# Patient Record
Sex: MALE | Race: WHITE | NOT HISPANIC OR LATINO | URBAN - METROPOLITAN AREA
[De-identification: names, ages, dates, MRNs, and addresses within clinical notes are randomized per-mention and may not be internally consistent; named-entity substitution may affect disease eponyms.]

---

## 2018-07-15 ENCOUNTER — INPATIENT (INPATIENT)
Facility: HOSPITAL | Age: 58
LOS: 2 days | Discharge: HOME | DRG: 189 | End: 2018-07-18
Attending: HOSPITALIST | Admitting: INTERNAL MEDICINE
Payer: OTHER GOVERNMENT

## 2018-07-15 VITALS — WEIGHT: 214.95 LBS | HEIGHT: 68 IN

## 2018-07-15 DIAGNOSIS — J96.01 ACUTE RESPIRATORY FAILURE WITH HYPOXIA: ICD-10-CM

## 2018-07-15 DIAGNOSIS — J44.1 CHRONIC OBSTRUCTIVE PULMONARY DISEASE WITH (ACUTE) EXACERBATION: ICD-10-CM

## 2018-07-15 DIAGNOSIS — I25.10 ATHEROSCLEROTIC HEART DISEASE OF NATIVE CORONARY ARTERY WITHOUT ANGINA PECTORIS: ICD-10-CM

## 2018-07-15 DIAGNOSIS — E87.2 ACIDOSIS: ICD-10-CM

## 2018-07-15 DIAGNOSIS — G47.33 OBSTRUCTIVE SLEEP APNEA (ADULT) (PEDIATRIC): ICD-10-CM

## 2018-07-15 DIAGNOSIS — I10 ESSENTIAL (PRIMARY) HYPERTENSION: ICD-10-CM

## 2018-07-15 LAB
ALBUMIN SERPL ELPH-MCNC: 4.3 G/DL — SIGNIFICANT CHANGE UP (ref 3.3–5.2)
ALP SERPL-CCNC: 76 U/L — SIGNIFICANT CHANGE UP (ref 40–120)
ALT FLD-CCNC: 21 U/L — SIGNIFICANT CHANGE UP
ANION GAP SERPL CALC-SCNC: 23 MMOL/L — HIGH (ref 5–17)
ANISOCYTOSIS BLD QL: SLIGHT — SIGNIFICANT CHANGE UP
AST SERPL-CCNC: 39 U/L — SIGNIFICANT CHANGE UP
BASE EXCESS BLDA CALC-SCNC: -3.8 MMOL/L — LOW (ref -2–2)
BILIRUB SERPL-MCNC: 0.4 MG/DL — SIGNIFICANT CHANGE UP (ref 0.4–2)
BLOOD GAS COMMENTS ARTERIAL: SIGNIFICANT CHANGE UP
BUN SERPL-MCNC: 17 MG/DL — SIGNIFICANT CHANGE UP (ref 8–20)
CALCIUM SERPL-MCNC: 9.1 MG/DL — SIGNIFICANT CHANGE UP (ref 8.6–10.2)
CHLORIDE SERPL-SCNC: 97 MMOL/L — LOW (ref 98–107)
CK MB CFR SERPL CALC: 11.1 NG/ML — HIGH (ref 0–6.7)
CK SERPL-CCNC: 286 U/L — HIGH (ref 30–200)
CO2 SERPL-SCNC: 19 MMOL/L — LOW (ref 22–29)
CREAT SERPL-MCNC: 0.65 MG/DL — SIGNIFICANT CHANGE UP (ref 0.5–1.3)
GAS PNL BLDA: SIGNIFICANT CHANGE UP
GLUCOSE SERPL-MCNC: 109 MG/DL — SIGNIFICANT CHANGE UP (ref 70–115)
HCO3 BLDA-SCNC: 21 MMOL/L — SIGNIFICANT CHANGE UP (ref 20–26)
HCT VFR BLD CALC: 44.1 % — SIGNIFICANT CHANGE UP (ref 42–52)
HGB BLD-MCNC: 14.5 G/DL — SIGNIFICANT CHANGE UP (ref 14–18)
HOROWITZ INDEX BLDA+IHG-RTO: 50 — SIGNIFICANT CHANGE UP
LACTATE BLDV-MCNC: 7 MMOL/L — CRITICAL HIGH (ref 0.5–2)
LYMPHOCYTES # BLD AUTO: 5 % — LOW (ref 20–55)
MACROCYTES BLD QL: SLIGHT — SIGNIFICANT CHANGE UP
MCHC RBC-ENTMCNC: 32.9 G/DL — SIGNIFICANT CHANGE UP (ref 32–36)
MCHC RBC-ENTMCNC: 32.9 PG — HIGH (ref 27–31)
MCV RBC AUTO: 100 FL — HIGH (ref 80–94)
MICROCYTES BLD QL: SLIGHT — SIGNIFICANT CHANGE UP
MONOCYTES NFR BLD AUTO: 4 % — SIGNIFICANT CHANGE UP (ref 3–10)
NEUTROPHILS NFR BLD AUTO: 91 % — HIGH (ref 37–73)
NT-PROBNP SERPL-SCNC: 1078 PG/ML — HIGH (ref 0–300)
PCO2 BLDA: 43 MMHG — SIGNIFICANT CHANGE UP (ref 35–45)
PH BLDA: 7.32 — LOW (ref 7.35–7.45)
PLAT MORPH BLD: NORMAL — SIGNIFICANT CHANGE UP
PLATELET # BLD AUTO: 177 K/UL — SIGNIFICANT CHANGE UP (ref 150–400)
PO2 BLDA: 104 MMHG — SIGNIFICANT CHANGE UP (ref 83–108)
POTASSIUM SERPL-MCNC: 5.6 MMOL/L — HIGH (ref 3.5–5.3)
POTASSIUM SERPL-SCNC: 5.6 MMOL/L — HIGH (ref 3.5–5.3)
PROT SERPL-MCNC: 7.7 G/DL — SIGNIFICANT CHANGE UP (ref 6.6–8.7)
RBC # BLD: 4.41 M/UL — LOW (ref 4.6–6.2)
RBC # FLD: 15.9 % — HIGH (ref 11–15.6)
RBC BLD AUTO: ABNORMAL
SAO2 % BLDA: 98 % — SIGNIFICANT CHANGE UP (ref 95–99)
SODIUM SERPL-SCNC: 139 MMOL/L — SIGNIFICANT CHANGE UP (ref 135–145)
TROPONIN T SERPL-MCNC: <0.01 NG/ML — SIGNIFICANT CHANGE UP (ref 0–0.06)
WBC # BLD: 14.7 K/UL — HIGH (ref 4.8–10.8)
WBC # FLD AUTO: 14.7 K/UL — HIGH (ref 4.8–10.8)

## 2018-07-15 PROCEDURE — 93010 ELECTROCARDIOGRAM REPORT: CPT

## 2018-07-15 PROCEDURE — 71045 X-RAY EXAM CHEST 1 VIEW: CPT | Mod: 26

## 2018-07-15 PROCEDURE — 99291 CRITICAL CARE FIRST HOUR: CPT

## 2018-07-15 RX ORDER — TIOTROPIUM BROMIDE 18 UG/1
1 CAPSULE ORAL; RESPIRATORY (INHALATION) DAILY
Qty: 0 | Refills: 0 | Status: COMPLETED | OUTPATIENT
Start: 2018-07-15 | End: 2019-06-13

## 2018-07-15 RX ORDER — MAGNESIUM SULFATE 500 MG/ML
2 VIAL (ML) INJECTION ONCE
Qty: 0 | Refills: 0 | Status: COMPLETED | OUTPATIENT
Start: 2018-07-15 | End: 2018-07-15

## 2018-07-15 RX ORDER — CLOPIDOGREL BISULFATE 75 MG/1
75 TABLET, FILM COATED ORAL DAILY
Qty: 0 | Refills: 0 | Status: DISCONTINUED | OUTPATIENT
Start: 2018-07-15 | End: 2018-07-18

## 2018-07-15 RX ORDER — IPRATROPIUM/ALBUTEROL SULFATE 18-103MCG
3 AEROSOL WITH ADAPTER (GRAM) INHALATION EVERY 6 HOURS
Qty: 0 | Refills: 0 | Status: DISCONTINUED | OUTPATIENT
Start: 2018-07-15 | End: 2018-07-18

## 2018-07-15 RX ORDER — SODIUM CHLORIDE 9 MG/ML
3 INJECTION INTRAMUSCULAR; INTRAVENOUS; SUBCUTANEOUS ONCE
Qty: 0 | Refills: 0 | Status: COMPLETED | OUTPATIENT
Start: 2018-07-15 | End: 2018-07-15

## 2018-07-15 RX ORDER — METOPROLOL TARTRATE 50 MG
50 TABLET ORAL DAILY
Qty: 0 | Refills: 0 | Status: DISCONTINUED | OUTPATIENT
Start: 2018-07-15 | End: 2018-07-18

## 2018-07-15 RX ORDER — ALBUTEROL 90 UG/1
2.5 AEROSOL, METERED ORAL ONCE
Qty: 0 | Refills: 0 | Status: COMPLETED | OUTPATIENT
Start: 2018-07-15 | End: 2018-07-15

## 2018-07-15 RX ORDER — AZITHROMYCIN 500 MG/1
TABLET, FILM COATED ORAL
Qty: 0 | Refills: 0 | Status: DISCONTINUED | OUTPATIENT
Start: 2018-07-15 | End: 2018-07-16

## 2018-07-15 RX ORDER — ASPIRIN/CALCIUM CARB/MAGNESIUM 324 MG
81 TABLET ORAL DAILY
Qty: 0 | Refills: 0 | Status: DISCONTINUED | OUTPATIENT
Start: 2018-07-15 | End: 2018-07-18

## 2018-07-15 RX ORDER — ASPIRIN/CALCIUM CARB/MAGNESIUM 324 MG
1 TABLET ORAL
Qty: 0 | Refills: 0 | COMMUNITY

## 2018-07-15 RX ORDER — AZITHROMYCIN 500 MG/1
500 TABLET, FILM COATED ORAL ONCE
Qty: 0 | Refills: 0 | Status: COMPLETED | OUTPATIENT
Start: 2018-07-15 | End: 2018-07-15

## 2018-07-15 RX ORDER — FUROSEMIDE 40 MG
40 TABLET ORAL ONCE
Qty: 0 | Refills: 0 | Status: COMPLETED | OUTPATIENT
Start: 2018-07-15 | End: 2018-07-15

## 2018-07-15 RX ORDER — ENOXAPARIN SODIUM 100 MG/ML
40 INJECTION SUBCUTANEOUS DAILY
Qty: 0 | Refills: 0 | Status: DISCONTINUED | OUTPATIENT
Start: 2018-07-15 | End: 2018-07-18

## 2018-07-15 RX ORDER — LOSARTAN POTASSIUM 100 MG/1
1 TABLET, FILM COATED ORAL
Qty: 0 | Refills: 0 | COMMUNITY

## 2018-07-15 RX ORDER — CLOPIDOGREL BISULFATE 75 MG/1
1 TABLET, FILM COATED ORAL
Qty: 0 | Refills: 0 | COMMUNITY

## 2018-07-15 RX ORDER — TIOTROPIUM BROMIDE 18 UG/1
1 CAPSULE ORAL; RESPIRATORY (INHALATION)
Qty: 0 | Refills: 0 | COMMUNITY

## 2018-07-15 RX ORDER — LABETALOL HCL 100 MG
10 TABLET ORAL EVERY 4 HOURS
Qty: 0 | Refills: 0 | Status: DISCONTINUED | OUTPATIENT
Start: 2018-07-15 | End: 2018-07-18

## 2018-07-15 RX ORDER — IPRATROPIUM/ALBUTEROL SULFATE 18-103MCG
3 AEROSOL WITH ADAPTER (GRAM) INHALATION ONCE
Qty: 0 | Refills: 0 | Status: COMPLETED | OUTPATIENT
Start: 2018-07-15 | End: 2018-07-15

## 2018-07-15 RX ORDER — AZITHROMYCIN 500 MG/1
500 TABLET, FILM COATED ORAL EVERY 24 HOURS
Qty: 0 | Refills: 0 | Status: DISCONTINUED | OUTPATIENT
Start: 2018-07-16 | End: 2018-07-16

## 2018-07-15 RX ORDER — ALBUTEROL 90 UG/1
1 AEROSOL, METERED ORAL EVERY 4 HOURS
Qty: 0 | Refills: 0 | Status: DISCONTINUED | OUTPATIENT
Start: 2018-07-15 | End: 2018-07-17

## 2018-07-15 RX ORDER — METOPROLOL TARTRATE 50 MG
1 TABLET ORAL
Qty: 0 | Refills: 0 | COMMUNITY

## 2018-07-15 RX ORDER — LOSARTAN POTASSIUM 100 MG/1
25 TABLET, FILM COATED ORAL DAILY
Qty: 0 | Refills: 0 | Status: DISCONTINUED | OUTPATIENT
Start: 2018-07-15 | End: 2018-07-18

## 2018-07-15 RX ADMIN — LOSARTAN POTASSIUM 25 MILLIGRAM(S): 100 TABLET, FILM COATED ORAL at 23:15

## 2018-07-15 RX ADMIN — Medication 81 MILLIGRAM(S): at 23:15

## 2018-07-15 RX ADMIN — ENOXAPARIN SODIUM 40 MILLIGRAM(S): 100 INJECTION SUBCUTANEOUS at 23:15

## 2018-07-15 RX ADMIN — Medication 125 MILLIGRAM(S): at 19:05

## 2018-07-15 RX ADMIN — Medication 50 GRAM(S): at 19:04

## 2018-07-15 RX ADMIN — Medication 60 MILLIGRAM(S): at 23:18

## 2018-07-15 RX ADMIN — AZITHROMYCIN 255 MILLIGRAM(S): 500 TABLET, FILM COATED ORAL at 23:15

## 2018-07-15 RX ADMIN — CLOPIDOGREL BISULFATE 75 MILLIGRAM(S): 75 TABLET, FILM COATED ORAL at 23:15

## 2018-07-15 RX ADMIN — Medication 50 MILLIGRAM(S): at 23:17

## 2018-07-15 RX ADMIN — ALBUTEROL 2.5 MILLIGRAM(S): 90 AEROSOL, METERED ORAL at 19:05

## 2018-07-15 RX ADMIN — Medication 3 MILLILITER(S): at 19:05

## 2018-07-15 RX ADMIN — Medication 40 MILLIGRAM(S): at 19:04

## 2018-07-15 RX ADMIN — SODIUM CHLORIDE 3 MILLILITER(S): 9 INJECTION INTRAMUSCULAR; INTRAVENOUS; SUBCUTANEOUS at 19:05

## 2018-07-15 NOTE — ED PROVIDER NOTE - CARE PLAN
Principal Discharge DX:	Acute respiratory failure with hypoxia  Secondary Diagnosis:	COPD exacerbation

## 2018-07-15 NOTE — ED ADULT TRIAGE NOTE - CHIEF COMPLAINT QUOTE
pt comes to ed from home with increased shortness of breath. has copd noncompliant with cpap at night for sleep apnea. patient retracting; dr araujo to bedside; code team rendered further care in critical care area

## 2018-07-15 NOTE — ED PROVIDER NOTE - MEDICAL DECISION MAKING DETAILS
59 y/o M 59 y/o M presents with acute respiratory failure - CHF vs COPD: Will treat appropriately and consult NICU.

## 2018-07-15 NOTE — ED ADULT NURSE NOTE - OBJECTIVE STATEMENT
Pt with SOB that has become increasingly worse over the last week. No relief with inhaler at home. Denies any fevers or congestion. Pt smokes a half a pack per day.

## 2018-07-15 NOTE — H&P ADULT - PMH
CAD (coronary artery disease)    COPD (chronic obstructive pulmonary disease)    Hypertension    GLORIA (obstructive sleep apnea)

## 2018-07-15 NOTE — H&P ADULT - HISTORY OF PRESENT ILLNESS
58M hx COPD still smoking  GLORIA (partially compliant with nocturnal CPAP) [facility].  Ex polysubstance abuse (cocaine/marijuana).  ETOH abuse.  He has not had a drink in a few weeks and is currently in a [facility]ri[facility] the [facility].      Admits to monthly episodes of distressed breathing for which he is prescribed prednisone.  Has never been intubated or used NIV.   Has been SOB and wheezy for the last 2 days without change in cough or sputum.       In ED placed on BIPAP rx with steroids and nebulizers unable to liberate from NIV   some hypoxemia

## 2018-07-15 NOTE — H&P ADULT - ASSESSMENT
58M hx COPD still smoking  GLORIA obesity  HTN CAD with stent 2017 at [facility].      AECOPD with hypoxemic (VQ mismatch) respiratory failure unable to liberate from NIV.            CCT 33 min

## 2018-07-15 NOTE — ED PROVIDER NOTE - OBJECTIVE STATEMENT
59 y/o M with past hx of COPD and MI presents to the ED c/o SOB which onset today. Pt was found sitting on top of the stairs. As per EMS, pt's respiratory distress has worsened in the past 30 minutes. He has never been to the ED before and says he has never been told he had heart failure. Pt is on Symbicort, albuterol, Toprol, Plavix, and aspirin. He is a smoker and drink occasionally. Pt denies any street drug usage and denies fevers or chills. No further complaints at this time. 57 y/o M with past hx of COPD and MI presents to the ED c/o SOB which onset today. Pt was found sitting on top of the stairs. As per EMS, pt's respiratory distress has worsened in the past 30 minutes. He has never been to the ED before and says he has never been told he had heart failure. Pt is on Symbicort, albuterol, Toprol, Plavix, and aspirin. He is a smoker and drink occasionally. Pt denies any street drug usage and denies fevers, chills, cough, or rhinorrhea. No further complaints at this time. 59 y/o M with past hx of COPD and MI presents to the ED c/o SOB which onset today. Pt was found sitting on top of the stairs. As per EMS, pt's respiratory distress has worsened in the past 30 minutes. He has never been to the ED before and says he has never been told he had heart failure. Pt is on Symbicort, albuterol, Toprol, Plavix, and aspirin. He is a smoker, drinks occasionally, and is allergic to penicillin & Lisinopril. Pt denies any street drug usage and denies fevers, chills, cough, or rhinorrhea. No further complaints at this time.

## 2018-07-15 NOTE — ED PROVIDER NOTE - ENMT NEGATIVE STATEMENT, MLM
Ears: no ear pain and no hearing problems.Nose: no nasal congestion and no nasal drainage. no rhinorrhea. Mouth/Throat: no dysphagia, no hoarseness and no throat pain.Neck: no lumps, no pain, no stiffness and no swollen glands.

## 2018-07-16 DIAGNOSIS — J44.9 CHRONIC OBSTRUCTIVE PULMONARY DISEASE, UNSPECIFIED: ICD-10-CM

## 2018-07-16 DIAGNOSIS — I25.118 ATHEROSCLEROTIC HEART DISEASE OF NATIVE CORONARY ARTERY WITH OTHER FORMS OF ANGINA PECTORIS: ICD-10-CM

## 2018-07-16 LAB
ANION GAP SERPL CALC-SCNC: 17 MMOL/L — SIGNIFICANT CHANGE UP (ref 5–17)
BASE EXCESS BLDA CALC-SCNC: 7.2 MMOL/L — HIGH (ref -2–2)
BLOOD GAS COMMENTS ARTERIAL: SIGNIFICANT CHANGE UP
BUN SERPL-MCNC: 18 MG/DL — SIGNIFICANT CHANGE UP (ref 8–20)
CALCIUM SERPL-MCNC: 9.3 MG/DL — SIGNIFICANT CHANGE UP (ref 8.6–10.2)
CHLORIDE SERPL-SCNC: 93 MMOL/L — LOW (ref 98–107)
CO2 SERPL-SCNC: 28 MMOL/L — SIGNIFICANT CHANGE UP (ref 22–29)
CREAT SERPL-MCNC: 0.75 MG/DL — SIGNIFICANT CHANGE UP (ref 0.5–1.3)
FOLATE SERPL-MCNC: 11.1 NG/ML — SIGNIFICANT CHANGE UP
GAS PNL BLDA: SIGNIFICANT CHANGE UP
GLUCOSE SERPL-MCNC: 155 MG/DL — HIGH (ref 70–115)
HCO3 BLDA-SCNC: 31 MMOL/L — HIGH (ref 20–26)
HCT VFR BLD CALC: 43.7 % — SIGNIFICANT CHANGE UP (ref 42–52)
HGB BLD-MCNC: 14.1 G/DL — SIGNIFICANT CHANGE UP (ref 14–18)
HOROWITZ INDEX BLDA+IHG-RTO: SIGNIFICANT CHANGE UP
LACTATE BLDV-MCNC: 2.4 MMOL/L — HIGH (ref 0.5–2)
LACTATE SERPL-SCNC: 2.5 MMOL/L — HIGH (ref 0.5–2)
MCHC RBC-ENTMCNC: 32.3 G/DL — SIGNIFICANT CHANGE UP (ref 32–36)
MCHC RBC-ENTMCNC: 32.3 PG — HIGH (ref 27–31)
MCV RBC AUTO: 100 FL — HIGH (ref 80–94)
PCO2 BLDA: 37 MMHG — SIGNIFICANT CHANGE UP (ref 35–45)
PH BLDA: 7.52 — HIGH (ref 7.35–7.45)
PLATELET # BLD AUTO: 120 K/UL — LOW (ref 150–400)
PO2 BLDA: 92 MMHG — SIGNIFICANT CHANGE UP (ref 83–108)
POTASSIUM SERPL-MCNC: 4.3 MMOL/L — SIGNIFICANT CHANGE UP (ref 3.5–5.3)
POTASSIUM SERPL-SCNC: 4.3 MMOL/L — SIGNIFICANT CHANGE UP (ref 3.5–5.3)
RAPID RVP RESULT: SIGNIFICANT CHANGE UP
RBC # BLD: 4.37 M/UL — LOW (ref 4.6–6.2)
RBC # FLD: 15.3 % — SIGNIFICANT CHANGE UP (ref 11–15.6)
SAO2 % BLDA: 98 % — SIGNIFICANT CHANGE UP (ref 95–99)
SODIUM SERPL-SCNC: 138 MMOL/L — SIGNIFICANT CHANGE UP (ref 135–145)
VIT B12 SERPL-MCNC: 262 PG/ML — SIGNIFICANT CHANGE UP (ref 232–1245)
WBC # BLD: 15.6 K/UL — HIGH (ref 4.8–10.8)
WBC # FLD AUTO: 15.6 K/UL — HIGH (ref 4.8–10.8)

## 2018-07-16 PROCEDURE — 99222 1ST HOSP IP/OBS MODERATE 55: CPT

## 2018-07-16 PROCEDURE — 99233 SBSQ HOSP IP/OBS HIGH 50: CPT

## 2018-07-16 RX ORDER — AMLODIPINE BESYLATE 2.5 MG/1
5 TABLET ORAL DAILY
Qty: 0 | Refills: 0 | Status: DISCONTINUED | OUTPATIENT
Start: 2018-07-16 | End: 2018-07-18

## 2018-07-16 RX ORDER — NICOTINE POLACRILEX 2 MG
1 GUM BUCCAL DAILY
Qty: 0 | Refills: 0 | Status: DISCONTINUED | OUTPATIENT
Start: 2018-07-16 | End: 2018-07-18

## 2018-07-16 RX ORDER — ALPRAZOLAM 0.25 MG
0.25 TABLET ORAL ONCE
Qty: 0 | Refills: 0 | Status: DISCONTINUED | OUTPATIENT
Start: 2018-07-16 | End: 2018-07-16

## 2018-07-16 RX ORDER — HYDRALAZINE HCL 50 MG
10 TABLET ORAL ONCE
Qty: 0 | Refills: 0 | Status: COMPLETED | OUTPATIENT
Start: 2018-07-16 | End: 2018-07-16

## 2018-07-16 RX ORDER — AZITHROMYCIN 500 MG/1
500 TABLET, FILM COATED ORAL DAILY
Qty: 0 | Refills: 0 | Status: DISCONTINUED | OUTPATIENT
Start: 2018-07-16 | End: 2018-07-18

## 2018-07-16 RX ORDER — ALPRAZOLAM 0.25 MG
0.25 TABLET ORAL EVERY 8 HOURS
Qty: 0 | Refills: 0 | Status: DISCONTINUED | OUTPATIENT
Start: 2018-07-16 | End: 2018-07-18

## 2018-07-16 RX ORDER — SODIUM CHLORIDE 9 MG/ML
1000 INJECTION INTRAMUSCULAR; INTRAVENOUS; SUBCUTANEOUS
Qty: 0 | Refills: 0 | Status: DISCONTINUED | OUTPATIENT
Start: 2018-07-16 | End: 2018-07-17

## 2018-07-16 RX ORDER — THIAMINE MONONITRATE (VIT B1) 100 MG
100 TABLET ORAL DAILY
Qty: 0 | Refills: 0 | Status: DISCONTINUED | OUTPATIENT
Start: 2018-07-16 | End: 2018-07-18

## 2018-07-16 RX ORDER — FUROSEMIDE 40 MG
20 TABLET ORAL ONCE
Qty: 0 | Refills: 0 | Status: COMPLETED | OUTPATIENT
Start: 2018-07-16 | End: 2018-07-16

## 2018-07-16 RX ORDER — HYDRALAZINE HCL 50 MG
10 TABLET ORAL EVERY 6 HOURS
Qty: 0 | Refills: 0 | Status: DISCONTINUED | OUTPATIENT
Start: 2018-07-16 | End: 2018-07-18

## 2018-07-16 RX ADMIN — Medication 3 MILLILITER(S): at 20:49

## 2018-07-16 RX ADMIN — Medication 0.25 MILLIGRAM(S): at 10:28

## 2018-07-16 RX ADMIN — Medication 60 MILLIGRAM(S): at 11:16

## 2018-07-16 RX ADMIN — LOSARTAN POTASSIUM 25 MILLIGRAM(S): 100 TABLET, FILM COATED ORAL at 05:09

## 2018-07-16 RX ADMIN — Medication 100 MILLIGRAM(S): at 18:46

## 2018-07-16 RX ADMIN — Medication 60 MILLIGRAM(S): at 05:10

## 2018-07-16 RX ADMIN — Medication 10 MILLIGRAM(S): at 04:00

## 2018-07-16 RX ADMIN — Medication 3 MILLILITER(S): at 08:50

## 2018-07-16 RX ADMIN — Medication 1 PATCH: at 11:16

## 2018-07-16 RX ADMIN — Medication 3 MILLILITER(S): at 15:29

## 2018-07-16 RX ADMIN — Medication 50 MILLIGRAM(S): at 05:09

## 2018-07-16 RX ADMIN — Medication 20 MILLIGRAM(S): at 09:20

## 2018-07-16 RX ADMIN — CLOPIDOGREL BISULFATE 75 MILLIGRAM(S): 75 TABLET, FILM COATED ORAL at 11:16

## 2018-07-16 RX ADMIN — Medication 81 MILLIGRAM(S): at 11:16

## 2018-07-16 RX ADMIN — SODIUM CHLORIDE 50 MILLILITER(S): 9 INJECTION INTRAMUSCULAR; INTRAVENOUS; SUBCUTANEOUS at 18:46

## 2018-07-16 RX ADMIN — AMLODIPINE BESYLATE 5 MILLIGRAM(S): 2.5 TABLET ORAL at 10:28

## 2018-07-16 RX ADMIN — Medication 0.25 MILLIGRAM(S): at 18:46

## 2018-07-16 RX ADMIN — Medication 10 MILLIGRAM(S): at 09:19

## 2018-07-16 RX ADMIN — ENOXAPARIN SODIUM 40 MILLIGRAM(S): 100 INJECTION SUBCUTANEOUS at 11:16

## 2018-07-16 RX ADMIN — Medication 10 MILLIGRAM(S): at 00:17

## 2018-07-16 RX ADMIN — AZITHROMYCIN 500 MILLIGRAM(S): 500 TABLET, FILM COATED ORAL at 11:16

## 2018-07-16 RX ADMIN — Medication 60 MILLIGRAM(S): at 17:40

## 2018-07-16 RX ADMIN — Medication 10 MILLIGRAM(S): at 08:09

## 2018-07-16 RX ADMIN — Medication 3 MILLILITER(S): at 02:27

## 2018-07-16 RX ADMIN — Medication 0.25 MILLIGRAM(S): at 06:46

## 2018-07-16 NOTE — CONSULT NOTE ADULT - SUBJECTIVE AND OBJECTIVE BOX
PULMONARY CONSULT NOTE      ROBBIE RAEN-[id number]    Patient is a 58y old  Male who presents with a chief complaint of sob (15 Jul 2018 23:37)  58M hx COPD still smoking  GLORIA (partially compliant with nocturnal CPAP) [facility].  Ex polysubstance abuse (cocaine/marijuana).  ETOH abuse.  He has not had a drink in a few weeks and is currently in a [facility]ri[facility] program at the [facility].      Admits to monthly episodes of distressed breathing for which he is prescribed prednisone.  Has never been intubated or used NIV.   Has been SOB and wheezy for the last 2 days without change in cough or sputum.       In ED placed on BIPAP rx with steroids and nebulizers unable to liberate from NIV   some hypoxemia       HISTORY OF PRESENT ILLNESS:  feels sig better  care at [facility]  hx CAD, stent in past 2 yrs ago  smoker x 50 plus  on and off ETOH, binge recently, in program has sponsor  cough with sputum, has GLORIA, cpap at home   wasnt using  on [employer]SI disability for heart and lungs per pt  currently feels sig better  no chest pain, used bipap last pm  has home neb  MEDICATIONS  (STANDING):  ALBUTerol    90 MICROgram(s) HFA Inhaler 1 Puff(s) Inhalation every 4 hours  ALBUTerol/ipratropium for Nebulization 3 milliLiter(s) Nebulizer every 6 hours  amLODIPine   Tablet 5 milliGRAM(s) Oral daily  aspirin  chewable 81 milliGRAM(s) Oral daily  azithromycin   Tablet 500 milliGRAM(s) Oral daily  clopidogrel Tablet 75 milliGRAM(s) Oral daily  enoxaparin Injectable 40 milliGRAM(s) SubCutaneous daily  losartan 25 milliGRAM(s) Oral daily  methylPREDNISolone sodium succinate Injectable 60 milliGRAM(s) IV Push every 6 hours  metoprolol succinate ER 50 milliGRAM(s) Oral daily  nicotine - 21 mG/24Hr(s) Patch 1 patch Transdermal daily  tiotropium 18 MICROgram(s) Capsule 1 Capsule(s) Inhalation daily      MEDICATIONS  (PRN):  ALPRAZolam 0.25 milliGRAM(s) Oral every 8 hours PRN anxiety  hydrALAZINE Injectable 10 milliGRAM(s) IV Push every 6 hours PRN SBP > 160  labetalol Injectable 10 milliGRAM(s) IV Push every 4 hours PRN SBP> 160 mm/hg      Allergies    ACE inhibitors (Angioedema)  lisinopril (Unknown)  penicillin (Hives)  penicillins (Unknown)    Intolerances        PAST MEDICAL & SURGICAL HISTORY:  CAD (coronary artery disease)  GLORIA (obstructive sleep apnea)  Hypertension  COPD (chronic obstructive pulmonary disease)  No significant past surgical history      FAMILY HISTORY:  No pertinent family history in first degree relatives      SOCIAL HISTORY  Smoking History:     REVIEW OF SYSTEMS:    CONSTITUTIONAL:  No fevers, chills, sweats    HEENT:  Eyes:  No diplopia or blurred vision. ENT:  No earache, sore throat or runny nose.    CARDIOVASCULAR:  No pressure, squeezing, tightness, or heaviness about the chest; no palpitations.    RESPIRATORY:  see HPI    GASTROINTESTINAL:  No abdominal pain, nausea, vomiting or diarrhea.    GENITOURINARY:  No dysuria, frequency or urgency.    NEUROLOGIC:  No paresthesias, fasciculations, seizures or weakness.    PSYCHIATRIC:  No disorder of thought or mood.    Vital Signs Last 24 Hrs  T(C): 36.7 (16 Jul 2018 16:00), Max: 37.6 (15 Jul 2018 19:24)  T(F): 98 (16 Jul 2018 16:00), Max: 99.7 (15 Jul 2018 19:24)  HR: 81 (16 Jul 2018 16:00) (63 - 112)  BP: 147/71 (16 Jul 2018 16:00) (147/71 - 198/110)  BP(mean): 102 (16 Jul 2018 16:00) (101 - 145)  RR: 22 (16 Jul 2018 14:00) (19 - 50)  SpO2: 97% (16 Jul 2018 16:00) (90% - 97%)    PHYSICAL EXAMINATION:    GENERAL: The patient is a well-developed, well-nourished _in no apparent distress.     HEENT: Head is normocephalic and atraumatic. Extraocular muscles are intact. Mucous membranes are moist.     NECK: Supple.     LUNGS: moderate air entry with exp rhocnhi. Respirations unlabored    HEART: Regular rate and rhythm without murmur.    ABDOMEN: Soft, nontender, and nondistended.  No hepatosplenomegaly is noted.    EXTREMITIES: Without any cyanosis, clubbing, rash, lesions or edema.    NEUROLOGIC: Grossly intact.      LABS:                        14.1   15.6  )-----------( 120      ( 16 Jul 2018 06:44 )             43.7     07-16    138  |  93<L>  |  18.0  ----------------------------<  155<H>  4.3   |  28.0  |  0.75    Ca    9.3      16 Jul 2018 06:44    TPro  7.7  /  Alb  4.3  /  TBili  0.4  /  DBili  x   /  AST  39  /  ALT  21  /  AlkPhos  76  07-15    PT/INR - ( 15 Jul 2018 20:28 )   PT: 11.2 sec;   INR: 1.02 ratio         PTT - ( 15 Jul 2018 20:28 )  PTT:35.1 sec    ABG - ( 16 Jul 2018 15:56 )  pH, Arterial: 7.52  pH, Blood: x     /  pCO2: 37    /  pO2: 92    / HCO3: 31    / Base Excess: 7.2   /  SaO2: 98                CARDIAC MARKERS ( 15 Jul 2018 19:04 )  x     / <0.01 ng/mL / 286 U/L / x     / 11.1 ng/mL        Serum Pro-Brain Natriuretic Peptide: 1078 pg/mL (07-15-18 @ 19:04)    Lactate, Blood: 2.5 mmol/L (07-16-18 @ 00:47)        MICROBIOLOGY:    RADIOLOGY & ADDITIONAL STUDIES:  CXR reviewed

## 2018-07-16 NOTE — CONSULT NOTE ADULT - ASSESSMENT
resolving hypoxemic respiratory failure   resolved lactic acidosis  COPD with bronchospasm, GLORIA by hx - has cpap at home  CAD with stents, initial troponin negative, no chest pain  ETOH abuse  clinically improved  continue bipap nocturnal for now, has cpap at home  wean medrol, nebs  repeat CXR pa /lateral - [facility]  [facility] fro small nodules  repeat troponin  procalcitonin  mobilize  thiamine, folate resolving hypoxemic respiratory failure   resolved lactic acidosis  COPD with bronchospasm, GLORIA by hx - has cpap at home  CAD with stents, initial troponin negative, no chest pain  ETOH abuse  clinically improved  continue bipap nocturnal for now, has cpap at home  wean medrol, nebs  repeat CXR pa /lateral - CT scans followed  at [facility] for small nodules  repeat troponin  procalcitonin  mobilize  thiamine, folate resolving hypoxemic respiratory failure   resolved lactic acidosis  COPD with bronchospasm, GLORIA by hx - has cpap at home  CAD with stents, initial troponin negative, no chest pain  ETOH abuse  clinically improved  continue bipap nocturnal for now, has cpap at home  wean medrol, nebs  repeat CXR pa /lateral - CT scans followed  at [facility] for small nodules  repeat troponin, echocardiogram  procalcitonin  mobilize  thiamine, folate resolving hypoxemic respiratory failure   resolved lactic acidosis  COPD with bronchospasm, GLORIA by hx - has cpap at home  CAD with stents, initial troponin negative, no chest pain  ETOH abuse  clinically improved  not hypercapnic, will decrease pressures  on bipap  nocturnal for now, has cpap at home  wean medrol, nebs  repeat CXR pa /lateral - CT scans followed  at [facility] for small nodules  repeat troponin, echocardiogram  procalcitonin  mobilize  thiamine, folate resolving hypoxemic respiratory failure   resolved lactic acidosis , probable type B  COPD with bronchospasm, GLORIA by hx - has cpap at home  CAD with stents, initial troponin negative, no chest pain  ETOH abuse  clinically improved  not hypercapnic, will decrease pressures  on bipap  nocturnal for now, has cpap at home  wean medrol, nebs  repeat CXR pa /lateral - CT scans followed  at [facility] for small nodules  repeat troponin, echocardiogram  procalcitonin  mobilize  thiamine, folate

## 2018-07-16 NOTE — PROGRESS NOTE ADULT - ASSESSMENT
58M hx COPD still smoking  GLORIA obesity  HTN CAD with stent 2017 at [facility] p/w AECOPD with hypoxemic (VQ mismatch) respiratory failure placed on NIV.  Doing well on abx, nebs & IV steroids now off NIV.

## 2018-07-16 NOTE — PROGRESS NOTE ADULT - ASSESSMENT
58M hx COPD still smoking  GLORIA obesity  HTN CAD with stent 2017 at [facility].      Admitted with an Acute COPD exacerbation. Now improved with Bipap support, Steroids and bronchodilators.   He si now oK to transfer out of the ICU. He will be followed by Dr Heck and pulmonary at this point.

## 2018-07-17 LAB
LACTATE SERPL-SCNC: 3.1 MMOL/L — HIGH (ref 0.5–2)
LACTATE SERPL-SCNC: 3.7 MMOL/L — HIGH (ref 0.5–2)
LEGIONELLA AG UR QL: NEGATIVE — SIGNIFICANT CHANGE UP
PROCALCITONIN SERPL-MCNC: 0.18 NG/ML — HIGH (ref 0–0.04)
TROPONIN T SERPL-MCNC: <0.01 NG/ML — SIGNIFICANT CHANGE UP (ref 0–0.06)

## 2018-07-17 PROCEDURE — 71046 X-RAY EXAM CHEST 2 VIEWS: CPT | Mod: 26

## 2018-07-17 PROCEDURE — 99233 SBSQ HOSP IP/OBS HIGH 50: CPT

## 2018-07-17 PROCEDURE — 93306 TTE W/DOPPLER COMPLETE: CPT | Mod: 26

## 2018-07-17 RX ORDER — TIOTROPIUM BROMIDE 18 UG/1
1 CAPSULE ORAL; RESPIRATORY (INHALATION) DAILY
Qty: 0 | Refills: 0 | Status: DISCONTINUED | OUTPATIENT
Start: 2018-07-17 | End: 2018-07-18

## 2018-07-17 RX ORDER — SODIUM CHLORIDE 9 MG/ML
1000 INJECTION INTRAMUSCULAR; INTRAVENOUS; SUBCUTANEOUS
Qty: 0 | Refills: 0 | Status: DISCONTINUED | OUTPATIENT
Start: 2018-07-17 | End: 2018-07-18

## 2018-07-17 RX ORDER — ALBUTEROL 90 UG/1
1 AEROSOL, METERED ORAL EVERY 4 HOURS
Qty: 0 | Refills: 0 | Status: COMPLETED | OUTPATIENT
Start: 2018-07-17 | End: 2019-06-15

## 2018-07-17 RX ORDER — BUDESONIDE AND FORMOTEROL FUMARATE DIHYDRATE 160; 4.5 UG/1; UG/1
2 AEROSOL RESPIRATORY (INHALATION)
Qty: 0 | Refills: 0 | Status: DISCONTINUED | OUTPATIENT
Start: 2018-07-17 | End: 2018-07-18

## 2018-07-17 RX ORDER — ALBUTEROL 90 UG/1
1 AEROSOL, METERED ORAL EVERY 4 HOURS
Qty: 0 | Refills: 0 | Status: DISCONTINUED | OUTPATIENT
Start: 2018-07-17 | End: 2018-07-17

## 2018-07-17 RX ORDER — ALPRAZOLAM 0.25 MG
0.25 TABLET ORAL ONCE
Qty: 0 | Refills: 0 | Status: DISCONTINUED | OUTPATIENT
Start: 2018-07-17 | End: 2018-07-17

## 2018-07-17 RX ORDER — ALBUTEROL 90 UG/1
1 AEROSOL, METERED ORAL EVERY 4 HOURS
Qty: 0 | Refills: 0 | Status: DISCONTINUED | OUTPATIENT
Start: 2018-07-17 | End: 2018-07-18

## 2018-07-17 RX ADMIN — Medication 60 MILLIGRAM(S): at 13:28

## 2018-07-17 RX ADMIN — CLOPIDOGREL BISULFATE 75 MILLIGRAM(S): 75 TABLET, FILM COATED ORAL at 13:30

## 2018-07-17 RX ADMIN — Medication 60 MILLIGRAM(S): at 06:45

## 2018-07-17 RX ADMIN — Medication 81 MILLIGRAM(S): at 13:29

## 2018-07-17 RX ADMIN — Medication 3 MILLILITER(S): at 03:52

## 2018-07-17 RX ADMIN — Medication 3 MILLILITER(S): at 15:21

## 2018-07-17 RX ADMIN — Medication 1 PATCH: at 13:30

## 2018-07-17 RX ADMIN — Medication 600 MILLIGRAM(S): at 19:12

## 2018-07-17 RX ADMIN — AZITHROMYCIN 500 MILLIGRAM(S): 500 TABLET, FILM COATED ORAL at 13:29

## 2018-07-17 RX ADMIN — LOSARTAN POTASSIUM 25 MILLIGRAM(S): 100 TABLET, FILM COATED ORAL at 06:45

## 2018-07-17 RX ADMIN — Medication 0.25 MILLIGRAM(S): at 06:50

## 2018-07-17 RX ADMIN — Medication 50 MILLIGRAM(S): at 06:45

## 2018-07-17 RX ADMIN — Medication 3 MILLILITER(S): at 08:29

## 2018-07-17 RX ADMIN — AMLODIPINE BESYLATE 5 MILLIGRAM(S): 2.5 TABLET ORAL at 06:50

## 2018-07-17 RX ADMIN — Medication 0.25 MILLIGRAM(S): at 12:03

## 2018-07-17 RX ADMIN — Medication 100 MILLIGRAM(S): at 13:30

## 2018-07-17 RX ADMIN — Medication 0.25 MILLIGRAM(S): at 17:55

## 2018-07-17 RX ADMIN — SODIUM CHLORIDE 75 MILLILITER(S): 9 INJECTION INTRAMUSCULAR; INTRAVENOUS; SUBCUTANEOUS at 16:54

## 2018-07-17 RX ADMIN — Medication 60 MILLIGRAM(S): at 00:45

## 2018-07-17 RX ADMIN — Medication 3 MILLILITER(S): at 21:33

## 2018-07-17 NOTE — PROGRESS NOTE ADULT - ASSESSMENT
resolving hypoxemic respiratory failure   resolving lactic acidosis , probable type B, repeat in am  COPD with bronchospasm, GLORIA by hx - has cpap at home  CAD with stents, initial troponin negative, no chest pain  ETOH abuse  clinically improved  not hypercapnic, will d/c bipap , has cpap at home  wean medrol, nebs, po prednisone taper  repeat CXR pa /lateral - CT scans followed  [facility] for small nodules  procalcitonin mildly increased, finish 5 day course of abx, mucinex  discussed [facility] follow up and use of duo neb ATC  at home  home 02 eval prior to d/c  smoking cessation  prognosis guarded resolving hypoxemic respiratory failure   resolving lactic acidosis , probable type B, repeat pending  COPD with bronchospasm, GLORIA by hx - has cpap at home  CAD with stents, initial troponin negative, no chest pain  ETOH abuse  clinically improved  not hypercapnic, will d/c bipap , has cpap at home  wean medrol, nebs, po prednisone taper  repeat CXR pa /lateral - CT scans followed  at [facility] for small nodules  procalcitonin mildly increased, finish 5 day course of abx, mucinex  discussed [facility] follow up and use of duo neb ATC  at home  home 02 eval prior to d/c  smoking cessation  prognosis guarded resolving hypoxemic respiratory failure   resolving lactic acidosis , probable type B, repeat pending  COPD with bronchospasm, GLORIA by hx - has cpap at home  CAD with stents, initial troponin negative, no chest pain  ETOH abuse  clinically improved  not hypercapnic, will d/c bipap , has cpap at home  wean medrol, nebs, po prednisone taper  repeat CXR pa /lateral - CT scans followed  at [facility] for small nodules  procalcitonin mildly increased, finish 5 day course of abx, mucinex  discussed [facility] up and use of duo neb ATC  at home  home 02 eval prior to d/c  segmental wall motion abnormalities on echocardiogram, should have cardiology evaluation prior to d/c  smoking cessation  prognosis guarded

## 2018-07-17 NOTE — PHYSICAL THERAPY INITIAL EVALUATION ADULT - ACTIVE RANGE OF MOTION EXAMINATION, REHAB EVAL
bilateral lower extremity Active ROM was WNL (within normal limits)/ivania. upper extremity Active ROM was WNL (within normal limits)

## 2018-07-17 NOTE — PROGRESS NOTE ADULT - PROBLEM SELECTOR PLAN 2
Taper IV steroids, nebs, zithromax po, RVP -ve  Pulm consult appreciated  No hypercapnia on admission, ABG consistent with metabolic acidosis due to lactic acidosis likely from the hypoxia.  Uses spiriva, symbiocort, albuterol & nebs  No O2 or chronic steroids  procalcitonin  repeat CXR pa /lateral  Pulm consult appreciated
Will continue steroids and Bronchodilators. PO Zithromax as well for 5 days total.  Added Nicotine patch and dose of lasix today.   Needs to mobilize  and ambulate   DVT prophylaxis
Taper IV steroids from q6 to q12, switch to PO in am,  nebs, zithromax po, RVP -ve  Pulm consult appreciated  No hypercapnia on admission, ABG consistent with metabolic acidosis due to lactic acidosis likely from the hypoxia.  Uses spiriva, Symbicort, albuterol & nebs  No O2 or chronic steroids at home  procalcitonin 0.18  repeat CXR pa /lateral unchanged  Pulm consult appreciated

## 2018-07-17 NOTE — PHYSICAL THERAPY INITIAL EVALUATION ADULT - ADDITIONAL COMMENTS
pt states he lives alone in a second floor apartment with 15 steps to enter (+rail).  independent with all mobility without device.

## 2018-07-17 NOTE — PROGRESS NOTE ADULT - PROBLEM SELECTOR PLAN 1
Related to acute COPD exacerbation and non-compliance with his care at [facility], including active smoking.  NOw improved off Bipap
Due to VQ mismatching.  as a consequence of AECOPD    Resolving, now off BiPAP & NC
Due to VQ mismatching.  as a consequence of AECOPD    Resolving, now off BiPAP

## 2018-07-17 NOTE — PROGRESS NOTE ADULT - PROBLEM SELECTOR PLAN 3
ABG consistent with metabolic acidosis due to lactic acidosis likely from the hypoxia which has improved
Resolved
ABG consistent with metabolic acidosis due to lactic acidosis likely from the hypoxia which has improved  LA trending up to 3.7, added IVF

## 2018-07-17 NOTE — PROGRESS NOTE ADULT - SUBJECTIVE AND OBJECTIVE BOX
Patient is a 58y old  Male who presents with a chief complaint of sob (15 Jul 2018 23:37)      BRIEF HOSPITAL COURSE: 58M with a PMhx of HTN, COPD, GLORIA, Smoking and former ETOH/Drug use. He admits to being non-compliant with his therapy including use of his nocturnal CPAP. He states he hasnt had any alcohol in several weeks. He presented with respiratory distress, with hypoxia ultimately requiring Bipap. His CXR no discreet infiltrates or evidence of PVC. He was placed on Bipap and given steroids and bronchodilators, with some imporvement.         Events last 24 hours: This morning we were able to wean him off dontrell bipap. His work of breathing was improved but not completely normal. His BP remained uncontrolled and Norvas was added along with diuretics. He BP improved and he had a good diuretic response. He is tolerating being off the Bipap well at this point. His EKG and troponin were negative for ischemic changes.      PAST MEDICAL & SURGICAL HISTORY:  CAD (coronary artery disease)  GLORIA (obstructive sleep apnea)  Hypertension  COPD (chronic obstructive pulmonary disease)  No significant past surgical history    Social HHx: + Smoking  Former ETOH and Drug abuse     Review of Systems:  CONSTITUTIONAL: No fever, chills, or fatigue  EYES: No eye pain, visual disturbances, or discharge  ENMT:  No difficulty hearing, tinnitus, vertigo; No sinus or throat pain  NECK: No pain or stiffness  RESPIRATORY: No cough, +wheezing, No chills or hemoptysis; + shortness of breath  CARDIOVASCULAR: No chest pain, palpitations, dizziness, or leg swelling  GASTROINTESTINAL: No abdominal or epigastric pain. No nausea, vomiting, or hematemesis; No diarrhea or constipation. No melena or hematochezia.  GENITOURINARY: No dysuria, frequency, hematuria, or incontinence  NEUROLOGICAL: No headaches, memory loss, loss of strength, numbness, or tremors  SKIN: No itching, burning, rashes, or lesions   MUSCULOSKELETAL: No joint pain or swelling; No muscle, back, or extremity pain  PSYCHIATRIC: +anxiety, No mood swings, or difficulty sleeping      Medications:  azithromycin   Tablet 500 milliGRAM(s) Oral daily    amLODIPine   Tablet 5 milliGRAM(s) Oral daily  hydrALAZINE Injectable 10 milliGRAM(s) IV Push every 6 hours PRN  labetalol Injectable 10 milliGRAM(s) IV Push every 4 hours PRN  losartan 25 milliGRAM(s) Oral daily  metoprolol succinate ER 50 milliGRAM(s) Oral daily    ALBUTerol    90 MICROgram(s) HFA Inhaler 1 Puff(s) Inhalation every 4 hours  ALBUTerol/ipratropium for Nebulization 3 milliLiter(s) Nebulizer every 6 hours  tiotropium 18 MICROgram(s) Capsule 1 Capsule(s) Inhalation daily    ALPRAZolam 0.25 milliGRAM(s) Oral every 8 hours PRN      aspirin  chewable 81 milliGRAM(s) Oral daily  clopidogrel Tablet 75 milliGRAM(s) Oral daily  enoxaparin Injectable 40 milliGRAM(s) SubCutaneous daily        methylPREDNISolone sodium succinate Injectable 60 milliGRAM(s) IV Push every 6 hours    sodium chloride 0.9%. 1000 milliLiter(s) IV Continuous <Continuous>  thiamine 100 milliGRAM(s) Oral daily        nicotine - 21 mG/24Hr(s) Patch 1 patch Transdermal daily          ICU Vital Signs Last 24 Hrs  T(C): 36.9 (16 Jul 2018 18:33), Max: 37.6 (15 Jul 2018 19:24)  T(F): 98.4 (16 Jul 2018 18:33), Max: 99.7 (15 Jul 2018 19:24)  HR: 82 (16 Jul 2018 18:33) (63 - 112)  BP: 124/78 (16 Jul 2018 18:33) (124/78 - 198/110)  BP(mean): 102 (16 Jul 2018 16:00) (101 - 145)  ABP: --  ABP(mean): --  RR: 20 (16 Jul 2018 18:33) (19 - 50)  SpO2: 97% (16 Jul 2018 18:33) (90% - 97%)      ABG - ( 16 Jul 2018 15:56 )  pH, Arterial: 7.52  pH, Blood: x     /  pCO2: 37    /  pO2: 92    / HCO3: 31    / Base Excess: 7.2   /  SaO2: 98                  I&O's Detail    15 Jul 2018 07:01  -  16 Jul 2018 07:00  --------------------------------------------------------  IN:    Solution: 250 mL  Total IN: 250 mL    OUT:    Voided: 1100 mL  Total OUT: 1100 mL    Total NET: -850 mL      16 Jul 2018 07:01  -  16 Jul 2018 18:38  --------------------------------------------------------  IN:    Oral Fluid: 1000 mL  Total IN: 1000 mL    OUT:    Voided: 1250 mL  Total OUT: 1250 mL    Total NET: -250 mL            LABS:                        14.1   15.6  )-----------( 120      ( 16 Jul 2018 06:44 )             43.7     07-16    138  |  93<L>  |  18.0  ----------------------------<  155<H>  4.3   |  28.0  |  0.75    Ca    9.3      16 Jul 2018 06:44    TPro  7.7  /  Alb  4.3  /  TBili  0.4  /  DBili  x   /  AST  39  /  ALT  21  /  AlkPhos  76  07-15      CARDIAC MARKERS ( 15 Jul 2018 19:04 )  x     / <0.01 ng/mL / 286 U/L / x     / 11.1 ng/mL      CAPILLARY BLOOD GLUCOSE        PT/INR - ( 15 Jul 2018 20:28 )   PT: 11.2 sec;   INR: 1.02 ratio         PTT - ( 15 Jul 2018 20:28 )  PTT:35.1 sec    CULTURES:  Rapid RVP Result: NotDetec (07-16-18 @ 00:45)      Physical Examination:    General: Obese male No acute distress.  Alert, oriented, interactive, nonfocal    HEENT: Pupils equal, reactive to light.  Symmetric.    PULM: Mild tachypnea, no distress, decreased BS B/L to bases      CVS: Regular rate and rhythm, no murmurs, rubs, or gallops    ABD: Soft, nondistended, nontender, normoactive bowel sounds, no masses    EXT: 1+ edema, nontender    SKIN: Warm and well perfused, no rashes noted.    RADIOLOGY: CXR:  Single frontal view of the chest demonstrates the lungs to be clear. The   cardiomediastinal silhouette is enlarged. No acute osseous abnormalities.   Old healed bilateral rib fracture deformities.    IMPRESSION: No acute cardiopulmonary disease process.
PULMONARY PROGRESS NOTE      [id number]    Patient is a 58y old  Male who presents with a chief complaint of sob (15 Jul 2018 23:37)      INTERVAL HPI/OVERNIGHT EVENTS:  feels sig better  ambulating in [place]  nad  anxious to go home  no chest pain  MEDICATIONS  (STANDING):  ALBUTerol    90 MICROgram(s) HFA Inhaler 1 Puff(s) Inhalation every 4 hours  ALBUTerol/ipratropium for Nebulization 3 milliLiter(s) Nebulizer every 6 hours  amLODIPine   Tablet 5 milliGRAM(s) Oral daily  aspirin  chewable 81 milliGRAM(s) Oral daily  azithromycin   Tablet 500 milliGRAM(s) Oral daily  clopidogrel Tablet 75 milliGRAM(s) Oral daily  enoxaparin Injectable 40 milliGRAM(s) SubCutaneous daily  losartan 25 milliGRAM(s) Oral daily  methylPREDNISolone sodium succinate Injectable 60 milliGRAM(s) IV Push every 12 hours  metoprolol succinate ER 50 milliGRAM(s) Oral daily  nicotine - 21 mG/24Hr(s) Patch 1 patch Transdermal daily  sodium chloride 0.9%. 1000 milliLiter(s) (75 mL/Hr) IV Continuous <Continuous>  thiamine 100 milliGRAM(s) Oral daily  tiotropium 18 MICROgram(s) Capsule 1 Capsule(s) Inhalation daily      MEDICATIONS  (PRN):  ALPRAZolam 0.25 milliGRAM(s) Oral every 8 hours PRN anxiety  hydrALAZINE Injectable 10 milliGRAM(s) IV Push every 6 hours PRN SBP > 160  labetalol Injectable 10 milliGRAM(s) IV Push every 4 hours PRN SBP> 160 mm/hg      Allergies    ACE inhibitors (Angioedema)  lisinopril (Unknown)  penicillin (Hives)  penicillins (Unknown)    Intolerances        PAST MEDICAL & SURGICAL HISTORY:  CAD (coronary artery disease)  GLORIA (obstructive sleep apnea)  Hypertension  COPD (chronic obstructive pulmonary disease)  No significant past surgical history      SOCIAL HISTORY  Smoking History:       REVIEW OF SYSTEMS:    CONSTITUTIONAL:  No distress    HEENT:  Eyes:  No diplopia or blurred vision. ENT:  No earache, sore throat or runny nose.    CARDIOVASCULAR:  No pressure, squeezing, tightness, heaviness or aching about the chest; no palpitations.    RESPIRATORY:  see hpi    GASTROINTESTINAL:  No nausea, vomiting or diarrhea.    GENITOURINARY:  No dysuria, frequency or urgency.    NEUROLOGIC:  No paresthesias, fasciculations, seizures or weakness.    PSYCHIATRIC:  No disorder of thought or mood.    Vital Signs Last 24 Hrs  T(C): 36.6 (17 Jul 2018 17:11), Max: 37.2 (17 Jul 2018 11:41)  T(F): 97.9 (17 Jul 2018 17:11), Max: 98.9 (17 Jul 2018 11:41)  HR: 77 (17 Jul 2018 17:11) (70 - 86)  BP: 129/79 (17 Jul 2018 17:11) (124/78 - 144/84)  BP(mean): --  RR: 19 (17 Jul 2018 17:11) (18 - 98)  SpO2: 94% (17 Jul 2018 17:11) (93% - 99%)    PHYSICAL EXAMINATION:    GENERAL: The patient is awake and alert in no apparent distress.     HEENT: Head is normocephalic and atraumatic. Extraocular muscles are intact. Mucous membranes are moist.    NECK: Supple.    LUNGS: moderate air entry  without wheezing, rales or rhonchi; respirations unlabored    HEART: Regular rate and rhythm without murmur.    ABDOMEN: Soft, nontender, and nondistended.      EXTREMITIES: Without any cyanosis, clubbing, rash, lesions or edema.    NEUROLOGIC: Grossly intact.    LABS:                        14.1   15.6  )-----------( 120      ( 16 Jul 2018 06:44 )             43.7     07-16    138  |  93<L>  |  18.0  ----------------------------<  155<H>  4.3   |  28.0  |  0.75    Ca    9.3      16 Jul 2018 06:44    TPro  7.7  /  Alb  4.3  /  TBili  0.4  /  DBili  x   /  AST  39  /  ALT  21  /  AlkPhos  76  07-15    PT/INR - ( 15 Jul 2018 20:28 )   PT: 11.2 sec;   INR: 1.02 ratio         PTT - ( 15 Jul 2018 20:28 )  PTT:35.1 sec    ABG - ( 16 Jul 2018 15:56 )  pH, Arterial: 7.52  pH, Blood: x     /  pCO2: 37    /  pO2: 92    / HCO3: 31    / Base Excess: 7.2   /  SaO2: 98                CARDIAC MARKERS ( 17 Jul 2018 07:20 )  x     / <0.01 ng/mL / x     / x     / x      CARDIAC MARKERS ( 15 Jul 2018 19:04 )  x     / <0.01 ng/mL / 286 U/L / x     / 11.1 ng/mL        Serum Pro-Brain Natriuretic Peptide: 1078 pg/mL (07-15-18 @ 19:04)    Lactate, Blood: 3.7 mmol/L (07-17-18 @ 11:33)    Procalcitonin, Serum: 0.18 ng/mL (07-17-18 @ 11:33)      MICROBIOLOGY:    RADIOLOGY & ADDITIONAL STUDIES:  cxr reviewed
CHIEF COMPLAINT/INTERVAL HISTORY:    Patient is a 58y old  Male who presents with a chief complaint of sob (15 Jul 2018 23:37)      HPI:  58M hx COPD still smoking  GLORIA (partially compliant with nocturnal CPAP) [facility].  Ex polysubstance abuse (cocaine/marijuana).  ETOH abuse.  He has not had a drink in a few weeks and is currently in [facility].      Admits to monthly episodes of distressed breathing for which he is prescribed prednisone.  Has never been intubated or used NIV.   Has been SOB and wheezy for the last 2 days without change in cough or sputum.       In ED placed on BIPAP rx with steroids and nebulizers unable to liberate from NIV   some hypoxemia (15 Jul 2018 22:14)      SUBJECTIVE & OBJECTIVE/ ROS: Pt seen and examined at bedside. Pt walking down the hallway. Pox on RA on ambulation is 94%. Feels better. SOB & cough improved.     ICU Vital Signs Last 24 Hrs  T(C): 37.2 (17 Jul 2018 11:41), Max: 37.2 (17 Jul 2018 11:41)  T(F): 98.9 (17 Jul 2018 11:41), Max: 98.9 (17 Jul 2018 11:41)  HR: 74 (17 Jul 2018 15:36) (70 - 86)  BP: 143/83 (17 Jul 2018 11:41) (124/78 - 147/71)  BP(mean): 102 (16 Jul 2018 16:00) (102 - 102)  ABP: --  ABP(mean): --  RR: 18 (17 Jul 2018 11:41) (18 - 98)  SpO2: 93% (17 Jul 2018 11:41) (93% - 99%)        MEDICATIONS  (STANDING):  ALBUTerol    90 MICROgram(s) HFA Inhaler 1 Puff(s) Inhalation every 4 hours  ALBUTerol/ipratropium for Nebulization 3 milliLiter(s) Nebulizer every 6 hours  amLODIPine   Tablet 5 milliGRAM(s) Oral daily  aspirin  chewable 81 milliGRAM(s) Oral daily  azithromycin   Tablet 500 milliGRAM(s) Oral daily  clopidogrel Tablet 75 milliGRAM(s) Oral daily  enoxaparin Injectable 40 milliGRAM(s) SubCutaneous daily  losartan 25 milliGRAM(s) Oral daily  methylPREDNISolone sodium succinate Injectable 60 milliGRAM(s) IV Push every 8 hours  metoprolol succinate ER 50 milliGRAM(s) Oral daily  nicotine - 21 mG/24Hr(s) Patch 1 patch Transdermal daily  thiamine 100 milliGRAM(s) Oral daily  tiotropium 18 MICROgram(s) Capsule 1 Capsule(s) Inhalation daily    MEDICATIONS  (PRN):  ALPRAZolam 0.25 milliGRAM(s) Oral every 8 hours PRN anxiety  hydrALAZINE Injectable 10 milliGRAM(s) IV Push every 6 hours PRN SBP > 160  labetalol Injectable 10 milliGRAM(s) IV Push every 4 hours PRN SBP> 160 mm/hg      LABS:                        14.1   15.6  )-----------( 120      ( 16 Jul 2018 06:44 )             43.7     07-16    138  |  93<L>  |  18.0  ----------------------------<  155<H>  4.3   |  28.0  |  0.75    Ca    9.3      16 Jul 2018 06:44    TPro  7.7  /  Alb  4.3  /  TBili  0.4  /  DBili  x   /  AST  39  /  ALT  21  /  AlkPhos  76  07-15    PT/INR - ( 15 Jul 2018 20:28 )   PT: 11.2 sec;   INR: 1.02 ratio         PTT - ( 15 Jul 2018 20:28 )  PTT:35.1 sec      CAPILLARY BLOOD GLUCOSE          RECENT CULTURES:      RADIOLOGY & ADDITIONAL TESTS:      PHYSICAL EXAM:    GENERAL: NAD, pursed lip breathing  HEAD:  Atraumatic, Normocephalic  EYES: EOMI, PERRLA, conjunctiva and sclera clear  ENMT: Moist mucous membranes  NECK: Supple, No JVD  NERVOUS SYSTEM:  Alert & Oriented X3, Motor Strength 5/5 B/L upper and lower extremities; DTRs 2+ intact and symmetric  CHEST/LUNG: decreased BS  bilaterally; No rales, rhonchi, wheezing, or rubs  HEART: Regular rate and rhythm; No murmurs, rubs, or gallops  ABDOMEN: Soft, Nontender, Nondistended; Bowel sounds present  EXTREMITIES:  2+ Peripheral Pulses, No clubbing, cyanosis, or edema
ACCEPTANCE NOTE:  Pt downgraded from ICU to floor, off BIPAP    CHIEF COMPLAINT/INTERVAL HISTORY:    Patient is a 58y old  Male who presents with a chief complaint of sob (15 Jul 2018 23:37)      HPI:  58M hx COPD still smoking  GLORIA (partially compliant with nocturnal CPAP) [facility].  Ex polysubstance abuse (cocaine/marijuana).  ETOH abuse.  He has not had a drink in a few weeks and is currently in a sobri[facility].      Admits to monthly episodes of distressed breathing for which he is prescribed prednisone.  Has never been intubated or used NIV.   Has been SOB and wheezy for the last 2 days without change in cough or sputum.       In ED placed on BIPAP rx with steroids and nebulizers unable to liberate from NIV   some hypoxemia (15 Jul 2018 22:14)      SUBJECTIVE & OBJECTIVE/ ROS: Pt seen and examined at bedside. off BiPAP. Doing well on 4L NC. SOB & productive cough with improvement as per pt    ICU Vital Signs Last 24 Hrs  T(C): 36.8 (16 Jul 2018 17:04), Max: 37.6 (15 Jul 2018 19:24)  T(F): 98.2 (16 Jul 2018 17:04), Max: 99.7 (15 Jul 2018 19:24)  HR: 84 (16 Jul 2018 17:00) (63 - 112)  BP: 147/71 (16 Jul 2018 16:00) (147/71 - 198/110)  BP(mean): 102 (16 Jul 2018 16:00) (101 - 145)  ABP: --  ABP(mean): --  RR: 43 (16 Jul 2018 17:00) (19 - 50)  SpO2: 96% (16 Jul 2018 17:00) (90% - 97%)        MEDICATIONS  (STANDING):  ALBUTerol    90 MICROgram(s) HFA Inhaler 1 Puff(s) Inhalation every 4 hours  ALBUTerol/ipratropium for Nebulization 3 milliLiter(s) Nebulizer every 6 hours  amLODIPine   Tablet 5 milliGRAM(s) Oral daily  aspirin  chewable 81 milliGRAM(s) Oral daily  azithromycin   Tablet 500 milliGRAM(s) Oral daily  clopidogrel Tablet 75 milliGRAM(s) Oral daily  enoxaparin Injectable 40 milliGRAM(s) SubCutaneous daily  losartan 25 milliGRAM(s) Oral daily  methylPREDNISolone sodium succinate Injectable 60 milliGRAM(s) IV Push every 6 hours  metoprolol succinate ER 50 milliGRAM(s) Oral daily  nicotine - 21 mG/24Hr(s) Patch 1 patch Transdermal daily  thiamine 100 milliGRAM(s) Oral daily  tiotropium 18 MICROgram(s) Capsule 1 Capsule(s) Inhalation daily    MEDICATIONS  (PRN):  ALPRAZolam 0.25 milliGRAM(s) Oral every 8 hours PRN anxiety  hydrALAZINE Injectable 10 milliGRAM(s) IV Push every 6 hours PRN SBP > 160  labetalol Injectable 10 milliGRAM(s) IV Push every 4 hours PRN SBP> 160 mm/hg      LABS:                        14.1   15.6  )-----------( 120      ( 16 Jul 2018 06:44 )             43.7     07-16    138  |  93<L>  |  18.0  ----------------------------<  155<H>  4.3   |  28.0  |  0.75    Ca    9.3      16 Jul 2018 06:44    TPro  7.7  /  Alb  4.3  /  TBili  0.4  /  DBili  x   /  AST  39  /  ALT  21  /  AlkPhos  76  07-15    PT/INR - ( 15 Jul 2018 20:28 )   PT: 11.2 sec;   INR: 1.02 ratio         PTT - ( 15 Jul 2018 20:28 )  PTT:35.1 sec      CAPILLARY BLOOD GLUCOSE          RECENT CULTURES:      RADIOLOGY & ADDITIONAL TESTS:      PHYSICAL EXAM:    GENERAL: NAD, pursed lip breathing  HEAD:  Atraumatic, Normocephalic  EYES: EOMI, PERRLA, conjunctiva and sclera clear  ENMT: Moist mucous membranes  NECK: Supple, No JVD  NERVOUS SYSTEM:  Alert & Oriented X3, Motor Strength 5/5 B/L upper and lower extremities; DTRs 2+ intact and symmetric  CHEST/LUNG: decreased BS  bilaterally; No rales, rhonchi, wheezing, or rubs  HEART: Regular rate and rhythm; No murmurs, rubs, or gallops  ABDOMEN: Soft, Nontender, Nondistended; Bowel sounds present  EXTREMITIES:  2+ Peripheral Pulses, No clubbing, cyanosis, or edema

## 2018-07-17 NOTE — PHYSICAL THERAPY INITIAL EVALUATION ADULT - PERTINENT HX OF CURRENT PROBLEM, REHAB EVAL
58M hx COPD still smoking  GLORIA obesity  HTN [facility] 2017 [facility].   Acute respiratory failure with hypoxia.

## 2018-07-17 NOTE — PROGRESS NOTE ADULT - ATTENDING COMMENTS
Last drink 1 month ago  Last drug use 1 year ago  PT eval recs home    D/c home in am
Last drink 1 month ago  Last drug use 1 year ago  SW & PT [place]

## 2018-07-18 VITALS
DIASTOLIC BLOOD PRESSURE: 86 MMHG | RESPIRATION RATE: 18 BRPM | HEART RATE: 89 BPM | TEMPERATURE: 99 F | SYSTOLIC BLOOD PRESSURE: 144 MMHG

## 2018-07-18 LAB — LACTATE SERPL-SCNC: 1.2 MMOL/L — SIGNIFICANT CHANGE UP (ref 0.5–2)

## 2018-07-18 PROCEDURE — 94760 N-INVAS EAR/PLS OXIMETRY 1: CPT

## 2018-07-18 PROCEDURE — 82803 BLOOD GASES ANY COMBINATION: CPT

## 2018-07-18 PROCEDURE — 96375 TX/PRO/DX INJ NEW DRUG ADDON: CPT

## 2018-07-18 PROCEDURE — 84484 ASSAY OF TROPONIN QUANT: CPT

## 2018-07-18 PROCEDURE — 94664 DEMO&/EVAL PT USE INHALER: CPT

## 2018-07-18 PROCEDURE — 85610 PROTHROMBIN TIME: CPT

## 2018-07-18 PROCEDURE — 93005 ELECTROCARDIOGRAM TRACING: CPT

## 2018-07-18 PROCEDURE — 87486 CHLMYD PNEUM DNA AMP PROBE: CPT

## 2018-07-18 PROCEDURE — 82746 ASSAY OF FOLIC ACID SERUM: CPT

## 2018-07-18 PROCEDURE — 36600 WITHDRAWAL OF ARTERIAL BLOOD: CPT

## 2018-07-18 PROCEDURE — 87449 NOS EACH ORGANISM AG IA: CPT

## 2018-07-18 PROCEDURE — 87040 BLOOD CULTURE FOR BACTERIA: CPT

## 2018-07-18 PROCEDURE — 71045 X-RAY EXAM CHEST 1 VIEW: CPT

## 2018-07-18 PROCEDURE — 83605 ASSAY OF LACTIC ACID: CPT

## 2018-07-18 PROCEDURE — 71046 X-RAY EXAM CHEST 2 VIEWS: CPT

## 2018-07-18 PROCEDURE — 99291 CRITICAL CARE FIRST HOUR: CPT | Mod: 25

## 2018-07-18 PROCEDURE — 82553 CREATINE MB FRACTION: CPT

## 2018-07-18 PROCEDURE — 80053 COMPREHEN METABOLIC PANEL: CPT

## 2018-07-18 PROCEDURE — 99239 HOSP IP/OBS DSCHRG MGMT >30: CPT

## 2018-07-18 PROCEDURE — 82550 ASSAY OF CK (CPK): CPT

## 2018-07-18 PROCEDURE — 87633 RESP VIRUS 12-25 TARGETS: CPT

## 2018-07-18 PROCEDURE — 94640 AIRWAY INHALATION TREATMENT: CPT

## 2018-07-18 PROCEDURE — 85027 COMPLETE CBC AUTOMATED: CPT

## 2018-07-18 PROCEDURE — 99223 1ST HOSP IP/OBS HIGH 75: CPT

## 2018-07-18 PROCEDURE — 97163 PT EVAL HIGH COMPLEX 45 MIN: CPT

## 2018-07-18 PROCEDURE — 82607 VITAMIN B-12: CPT

## 2018-07-18 PROCEDURE — 94660 CPAP INITIATION&MGMT: CPT

## 2018-07-18 PROCEDURE — 85730 THROMBOPLASTIN TIME PARTIAL: CPT

## 2018-07-18 PROCEDURE — 87581 M.PNEUMON DNA AMP PROBE: CPT

## 2018-07-18 PROCEDURE — 93306 TTE W/DOPPLER COMPLETE: CPT

## 2018-07-18 PROCEDURE — 84145 PROCALCITONIN (PCT): CPT

## 2018-07-18 PROCEDURE — 83880 ASSAY OF NATRIURETIC PEPTIDE: CPT

## 2018-07-18 PROCEDURE — 36415 COLL VENOUS BLD VENIPUNCTURE: CPT

## 2018-07-18 PROCEDURE — 87798 DETECT AGENT NOS DNA AMP: CPT

## 2018-07-18 PROCEDURE — 96374 THER/PROPH/DIAG INJ IV PUSH: CPT

## 2018-07-18 PROCEDURE — 80048 BASIC METABOLIC PNL TOTAL CA: CPT

## 2018-07-18 RX ORDER — ATORVASTATIN CALCIUM 80 MG/1
1 TABLET, FILM COATED ORAL
Qty: 30 | Refills: 0 | OUTPATIENT
Start: 2018-07-18 | End: 2018-08-16

## 2018-07-18 RX ORDER — AMLODIPINE BESYLATE 2.5 MG/1
1 TABLET ORAL
Qty: 30 | Refills: 0 | OUTPATIENT
Start: 2018-07-18 | End: 2018-08-16

## 2018-07-18 RX ORDER — ATORVASTATIN CALCIUM 80 MG/1
40 TABLET, FILM COATED ORAL AT BEDTIME
Qty: 0 | Refills: 0 | Status: DISCONTINUED | OUTPATIENT
Start: 2018-07-18 | End: 2018-07-18

## 2018-07-18 RX ORDER — IPRATROPIUM/ALBUTEROL SULFATE 18-103MCG
3 AEROSOL WITH ADAPTER (GRAM) INHALATION
Qty: 0 | Refills: 0 | COMMUNITY
Start: 2018-07-18

## 2018-07-18 RX ORDER — AZITHROMYCIN 500 MG/1
1 TABLET, FILM COATED ORAL
Qty: 2 | Refills: 0 | OUTPATIENT
Start: 2018-07-18 | End: 2018-07-19

## 2018-07-18 RX ORDER — ALPRAZOLAM 0.25 MG
1 TABLET ORAL
Qty: 0 | Refills: 0 | COMMUNITY
Start: 2018-07-18

## 2018-07-18 RX ORDER — ALBUTEROL 90 UG/1
1 AEROSOL, METERED ORAL
Qty: 0 | Refills: 0 | COMMUNITY
Start: 2018-07-18

## 2018-07-18 RX ORDER — BUDESONIDE AND FORMOTEROL FUMARATE DIHYDRATE 160; 4.5 UG/1; UG/1
2 AEROSOL RESPIRATORY (INHALATION)
Qty: 0 | Refills: 0 | COMMUNITY

## 2018-07-18 RX ADMIN — Medication 100 MILLIGRAM(S): at 11:57

## 2018-07-18 RX ADMIN — AMLODIPINE BESYLATE 5 MILLIGRAM(S): 2.5 TABLET ORAL at 05:56

## 2018-07-18 RX ADMIN — Medication 600 MILLIGRAM(S): at 05:55

## 2018-07-18 RX ADMIN — BUDESONIDE AND FORMOTEROL FUMARATE DIHYDRATE 2 PUFF(S): 160; 4.5 AEROSOL RESPIRATORY (INHALATION) at 08:56

## 2018-07-18 RX ADMIN — CLOPIDOGREL BISULFATE 75 MILLIGRAM(S): 75 TABLET, FILM COATED ORAL at 11:57

## 2018-07-18 RX ADMIN — Medication 81 MILLIGRAM(S): at 11:57

## 2018-07-18 RX ADMIN — Medication 60 MILLIGRAM(S): at 05:55

## 2018-07-18 RX ADMIN — Medication 50 MILLIGRAM(S): at 05:55

## 2018-07-18 RX ADMIN — AZITHROMYCIN 500 MILLIGRAM(S): 500 TABLET, FILM COATED ORAL at 11:57

## 2018-07-18 RX ADMIN — TIOTROPIUM BROMIDE 1 CAPSULE(S): 18 CAPSULE ORAL; RESPIRATORY (INHALATION) at 08:56

## 2018-07-18 RX ADMIN — Medication 3 MILLILITER(S): at 03:51

## 2018-07-18 RX ADMIN — Medication 3 MILLILITER(S): at 08:55

## 2018-07-18 RX ADMIN — Medication 0.25 MILLIGRAM(S): at 06:03

## 2018-07-18 RX ADMIN — LOSARTAN POTASSIUM 25 MILLIGRAM(S): 100 TABLET, FILM COATED ORAL at 05:55

## 2018-07-18 NOTE — DISCHARGE NOTE ADULT - PATIENT PORTAL LINK FT
You can access the [facility] Portal, offered by [facility], by registering with the following website: [email or web address]

## 2018-07-18 NOTE — DISCHARGE NOTE ADULT - SECONDARY DIAGNOSIS.
COPD exacerbation CAD (coronary artery disease) Hypertension Lactic acidosis GLORIA (obstructive sleep apnea)

## 2018-07-18 NOTE — CONSULT NOTE ADULT - ATTENDING COMMENTS
59 y/o obese, male smoker, COPD, GLORIA, obese, PCI (unknown vessel) with stent 2016, etoh abuse, presented with COPD exacerbation, initially requiring BIPAP.  Improved with steroids, abx.  An echo performed showed aneurysmal basal inferior segment and  inferoseptal hypokinesis, prompting cardiology consult.  At baseline denies any chest pressure.  He reports being told he had a "blockage" several months ago.   No evidence for acute coronary syndrome this admission, negative troponins.  BNP elevated, likely a reflection of diastolic dysfunction.  Discussed referral for catheterization as an outpatient.  He will follow up with his [facility] in the [facility].  To continue aspirin, metoprolol, plavix.  Start atorvastatin 40 mg qhs.  Counseled on tobacco cessation.

## 2018-07-18 NOTE — DISCHARGE NOTE ADULT - CARE PROVIDER_API CALL
Lei Ordoñez (DO), Critical Care Medicine; Internal Medicine; Pulmonary Disease  [address] [place] [address]  Phone: [phone]  Fax: [phone]

## 2018-07-18 NOTE — DISCHARGE NOTE ADULT - PLAN OF CARE
resolving Continue with meds as directed. Follow up with your primary doctor & pulmonology within 1 week of discharge Continue with meds as directed. Follow up with your cardiologist in 1 week for further testing. Continue with meds as directed. Follow up with your primary doctor resolved

## 2018-07-18 NOTE — CONSULT NOTE ADULT - ASSESSMENT
Assessment and Plan:   · Assessment		  58M hx COPD still smoking  GLORIA obesity  HTN CAD with stent 2017 at [facility] p/w AECOPD with hypoxemic (VQ mismatch) respiratory failure placed on NIV.  Doing well on abx, nebs & IV steroids now off NIV.          Problem/Plan - 1:  ·  Problem: Acute respiratory failure with hypoxia.  Plan: Due to VQ mismatching.  as a consequence of AECOPD    Resolving, now off BiPAP & NC.      Problem/Plan - 2:  ·  Problem: COPD exacerbation.  Plan: Taper IV steroids from q6 to q12, switch to PO in am,  nebs, zithromax po, RVP -ve     Problem/Plan - 3:  ·  Problem: Lactic acidosis.  Plan:  improved       Problem/Plan - 4:  ·  Problem: Hypertension.  Plan: resume home meds.      Problem/Plan - 5:  ·  Problem: GLORIA (obstructive sleep apnea).  Plan: CPAP at home (non compliant).      Problem/Plan - 6:  Problem: CAD (coronary artery disease). Plan: DAPT  no chest pain.  Abnormal  ECHO Segmental wall motion abnormalities in RCA territory. Left   ventricular ejection fraction, by visual estimation, is 50-55%. Grade I   diastolic dysfunction.  [facility] wishes [facility] at the [facility] with [facility] for further W/U

## 2018-07-18 NOTE — CONSULT NOTE ADULT - SUBJECTIVE AND OBJECTIVE BOX
Consult requested by: Dr Heck     Reason for Consultation: Abnormal ECHO    History obtained by: Patient and medical record     obtained: No    Chief complaint: I feel fine now    HPI:  58M hx COPD still smoking  GLORIA (partially compliant with nocturnal CPAP) [facility].  Ex polysubstance abuse (cocaine/marijuana).  ETOH abuse.  He has not had a drink in a few weeks and is currently in a sobri[facility] the [facility].    Admits to monthly episodes of distressed breathing for which he is prescribed prednisone.  Has never been intubated or used NIV.   Has been SOB and wheezy for the last 2 days without change in cough or sputum.     In ED placed on BIPAP rx with steroids and nebulizers unable to liberate from NIV   some hypoxemia (15 Jul 2018 22:14)  Above reviewed and noted: I saw and examined this patient in his room, he is in NAD, denies SOB, CP, N/V/D, weakness, fatigue, chills, sweats. Patient states he had [facility] about 3 months ago and was told he may have a small blockage. [facility] for [facility], he does not want to be here after 9AM today.      REVIEW OF SYSTEMS:  CONSTITUTIONAL: No fever, weight loss, or fatigue  EYES: No eye pain, visual disturbances, or discharge  ENMT:  No difficulty hearing, tinnitus, vertigo; No sinus or throat pain  NECK: No pain or stiffness  RESPIRATORY: No cough, wheezing, chills or hemoptysis; No shortness of breath  CARDIOVASCULAR: No chest pain, palpitations, dizziness, or leg swelling  GASTROINTESTINAL: No abdominal or epigastric pain. No nausea, vomiting, or hematemesis; No diarrhea or constipation. No melena or hematochezia.  GENITOURINARY: No dysuria, frequency, hematuria, or incontinence  NEUROLOGICAL: No headaches, memory loss, loss of strength, numbness, or tremors  SKIN: No itching, burning, rashes, or lesions   LYMPH NODES: No enlarged glands  ENDOCRINE: No heat or cold intolerance; No hair loss  MUSCULOSKELETAL: No joint pain or swelling; No muscle, back, or extremity pain  PSYCHIATRIC: No depression, anxiety, mood swings, or difficulty sleeping  HEME/LYMPH: No easy bruising, or bleeding gums  ALLERY AND IMMUNOLOGIC: No hives or eczema        MEDICATIONS  (STANDING):  ALBUTerol/ipratropium for Nebulization 3 milliLiter(s) Nebulizer every 6 hours  amLODIPine   Tablet 5 milliGRAM(s) Oral daily  aspirin  chewable 81 milliGRAM(s) Oral daily  azithromycin   Tablet 500 milliGRAM(s) Oral daily  buDESOnide  80 MICROgram(s)/formoterol 4.5 MICROgram(s) Inhaler 2 Puff(s) Inhalation two times a day  clopidogrel Tablet 75 milliGRAM(s) Oral daily  enoxaparin Injectable 40 milliGRAM(s) SubCutaneous daily  guaiFENesin  milliGRAM(s) Oral every 12 hours  losartan 25 milliGRAM(s) Oral daily  methylPREDNISolone sodium succinate Injectable 60 milliGRAM(s) IV Push every 12 hours  metoprolol succinate ER 50 milliGRAM(s) Oral daily  nicotine - 21 mG/24Hr(s) Patch 1 patch Transdermal daily  sodium chloride 0.9%. 1000 milliLiter(s) (75 mL/Hr) IV Continuous <Continuous>  thiamine 100 milliGRAM(s) Oral daily  tiotropium 18 MICROgram(s) Capsule 1 Capsule(s) Inhalation daily    MEDICATIONS  (PRN):  ALBUTerol    90 MICROgram(s) HFA Inhaler 1 Puff(s) Inhalation every 4 hours PRN Shortness of Breath and/or Wheezing  ALPRAZolam 0.25 milliGRAM(s) Oral every 8 hours PRN anxiety  hydrALAZINE Injectable 10 milliGRAM(s) IV Push every 6 hours PRN SBP > 160  labetalol Injectable 10 milliGRAM(s) IV Push every 4 hours PRN SBP> 160 mm/hg        PAST MEDICAL & SURGICAL HISTORY:  CAD (coronary artery disease)  GLORIA (obstructive sleep apnea)  Hypertension  COPD (chronic obstructive pulmonary disease)  No significant past surgical history      FAMILY HISTORY:  No pertinent family history in first degree relatives      SOCIAL HISTORY:  CIGARETTES:  current smoker  ALCOHOL: H/O abuse  DRUGS: H/O abuse    Vital Signs Last 24 Hrs  T(C): 36.9 (17 Jul 2018 22:41), Max: 37.2 (17 Jul 2018 11:41)  T(F): 98.4 (17 Jul 2018 22:41), Max: 98.9 (17 Jul 2018 11:41)  HR: 71 (18 Jul 2018 03:51) (71 - 86)  BP: 142/84 (17 Jul 2018 22:41) (128/75 - 143/83)  BP(mean): --  RR: 19 (17 Jul 2018 17:11) (18 - 98)  SpO2: 94% (18 Jul 2018 03:51) (93% - 96%)    PHYSICAL EXAM:  GENERAL: NAD, well-groomed, well-developed  HEAD:  Atraumatic, Normocephalic  EYES: EOMI, PERRLA, conjunctiva and sclera clear  ENMT: No tonsillar erythema, exudates, or enlargement; Moist mucous membranes, Good dentition, No lesions  NECK: Supple, No JVD, Normal thyroid  NERVOUS SYSTEM:  Alert & Oriented X3, Good concentration; Motor Strength 5/5 B/L upper and lower extremities  CHEST/LUNG: Clear to percussion bilaterally; No rales, rhonchi, wheezing, or rubs  HEART: Regular rate and rhythm; No murmurs, rubs, or gallops  ABDOMEN: Soft, Nontender, Nondistended; Bowel sounds present  EXTREMITIES:  2+ Peripheral Pulses, No clubbing, cyanosis, or edema  LYMPH: No lymphadenopathy noted  SKIN: No rashes or lesions      INTERPRETATION OF TELEMETRY: not on monitor    ECG:   Ventricular Rate 115 BPM    Atrial Rate 115 BPM    P-R Interval 194 ms    QRS Duration 90 ms    Q-T Interval 324 ms    QTC Calculation(Bezet) 448 ms    P Axis 52 degrees    R Axis 5 degrees    T Axis 28 degrees    Diagnosis Line *** Poor data quality, interpretation may be adversely affected  Sinus tachycardia  Possible Left atrial enlargement  Confirmed by NINA TRAN, MARU ([id number]) on 7/16/2018 7:38:02 AM          I&O's Detail    16 Jul 2018 07:01  -  17 Jul 2018 07:00  --------------------------------------------------------  IN:    Oral Fluid: 1240 mL  Total IN: 1240 mL    OUT:    Voided: 2925 mL  Total OUT: 2925 mL    Total NET: -1685 mL      17 Jul 2018 07:01  -  18 Jul 2018 05:16  --------------------------------------------------------  IN:    sodium chloride 0.9%.: 225 mL  Total IN: 225 mL    OUT:  Total OUT: 0 mL    Total NET: 225 mL          LABS:                        14.1   15.6  )-----------( 120      ( 16 Jul 2018 06:44 )             43.7     07-16    138  |  93<L>  |  18.0  ----------------------------<  155<H>  4.3   |  28.0  |  0.75    Ca    9.3      16 Jul 2018 06:44      CARDIAC MARKERS ( 17 Jul 2018 07:20 )  x     / <0.01 ng/mL / x     / x     / x              I&O's Summary    16 Jul 2018 07:01  -  17 Jul 2018 07:00  --------------------------------------------------------  IN: 1240 mL / OUT: 2925 mL / NET: -1685 mL    17 Jul 2018 07:01  -  18 Jul 2018 05:16  --------------------------------------------------------  IN: 225 mL / OUT: 0 mL / NET: 225 mL        RADIOLOGY & ADDITIONAL STUDIES:  X-ray:    IMPRESSION: No acute cardiopulmonary disease process.  ANNA SMITH M.D., ATTENDING RADIOLOGIST  This document has been electronically signed. Jul 16 2018  9:10AM            PREVIOUS DIAGNOSTIC TESTING:      ECHO:  Summary:   1. Technically limited study.   2. Mildly enlarged left atrium.   3. Segmental wall motion abnormalities in RCA territory. Left   ventricular ejection fraction, by visual estimation, is 50-55%. Grade I   diastolic dysfunction.   4. The right atrium is normal in size.   5. Normal right ventricular size and function.   6. No significant valvular abnormality.   7. There is no evidence of pericardial effusion.    [id number] Terell Ye MD, RPVI, Electronically signed on 7/17/2018 at   5:09:25 PM    STRESS    CATHETERIZATION Consult requested by: Dr Heck     Reason for Consultation: Abnormal ECHO    History obtained by: Patient and medical record     obtained: No    Chief complaint: I feel fine now    HPI: 57 y/o male smoker, COPD, GLORIA, obese, PCI (unknown vessel) with stent 2016, etoh abuse, presented with COPD exacerbation, initially requiring BIPAP.  Improved with steroids, abx.  An echo performed showed aneurysmal basal inferior segment and  inferoseptal hypokinesis, prompting cardiology consult.  At baseline denies any chest pressure, sedentary lifestyle.  He reports being told he had a "blockage" several months ago.        REVIEW OF SYSTEMS:  CONSTITUTIONAL: No fever, weight loss, or fatigue  EYES: No eye pain, visual disturbances, or discharge  ENMT:  No difficulty hearing, tinnitus, vertigo; No sinus or throat pain  NECK: No pain or stiffness  RESPIRATORY: +cough, wheezing, no chills or hemoptysis; + shortness of breath  CARDIOVASCULAR: No chest pain, palpitations, dizziness, or leg swelling  GASTROINTESTINAL: No abdominal or epigastric pain. No nausea, vomiting, or hematemesis; No diarrhea or constipation. No melena or hematochezia.  GENITOURINARY: No dysuria, frequency, hematuria, or incontinence  NEUROLOGICAL: No headaches, memory loss, loss of strength, numbness, or tremors  SKIN: No itching, burning, rashes, or lesions   LYMPH NODES: No enlarged glands  ENDOCRINE: No heat or cold intolerance; No hair loss  MUSCULOSKELETAL: No joint pain or swelling; No muscle, back, or extremity pain  PSYCHIATRIC: No depression, anxiety, mood swings, or difficulty sleeping  HEME/LYMPH: No easy bruising, or bleeding gums  ALLERY AND IMMUNOLOGIC: No hives or eczema        MEDICATIONS  (STANDING):  ALBUTerol/ipratropium for Nebulization 3 milliLiter(s) Nebulizer every 6 hours  amLODIPine   Tablet 5 milliGRAM(s) Oral daily  aspirin  chewable 81 milliGRAM(s) Oral daily  azithromycin   Tablet 500 milliGRAM(s) Oral daily  buDESOnide  80 MICROgram(s)/formoterol 4.5 MICROgram(s) Inhaler 2 Puff(s) Inhalation two times a day  clopidogrel Tablet 75 milliGRAM(s) Oral daily  enoxaparin Injectable 40 milliGRAM(s) SubCutaneous daily  guaiFENesin  milliGRAM(s) Oral every 12 hours  losartan 25 milliGRAM(s) Oral daily  methylPREDNISolone sodium succinate Injectable 60 milliGRAM(s) IV Push every 12 hours  metoprolol succinate ER 50 milliGRAM(s) Oral daily  nicotine - 21 mG/24Hr(s) Patch 1 patch Transdermal daily  sodium chloride 0.9%. 1000 milliLiter(s) (75 mL/Hr) IV Continuous <Continuous>  thiamine 100 milliGRAM(s) Oral daily  tiotropium 18 MICROgram(s) Capsule 1 Capsule(s) Inhalation daily    MEDICATIONS  (PRN):  ALBUTerol    90 MICROgram(s) HFA Inhaler 1 Puff(s) Inhalation every 4 hours PRN Shortness of Breath and/or Wheezing  ALPRAZolam 0.25 milliGRAM(s) Oral every 8 hours PRN anxiety  hydrALAZINE Injectable 10 milliGRAM(s) IV Push every 6 hours PRN SBP > 160  labetalol Injectable 10 milliGRAM(s) IV Push every 4 hours PRN SBP> 160 mm/hg        PAST MEDICAL & SURGICAL HISTORY:  CAD (coronary artery disease)  GLORIA (obstructive sleep apnea)  Hypertension  COPD (chronic obstructive pulmonary disease)  No significant past surgical history      FAMILY HISTORY:  No pertinent family history in first degree relatives      SOCIAL HISTORY:  CIGARETTES:  current smoker  ALCOHOL: H/O abuse  DRUGS: H/O abuse    Vital Signs Last 24 Hrs  T(C): 36.9 (17 Jul 2018 22:41), Max: 37.2 (17 Jul 2018 11:41)  T(F): 98.4 (17 Jul 2018 22:41), Max: 98.9 (17 Jul 2018 11:41)  HR: 71 (18 Jul 2018 03:51) (71 - 86)  BP: 142/84 (17 Jul 2018 22:41) (128/75 - 143/83)  BP(mean): --  RR: 19 (17 Jul 2018 17:11) (18 - 98)  SpO2: 94% (18 Jul 2018 03:51) (93% - 96%)    PHYSICAL EXAM:  GENERAL: NAD, well-groomed, well-developed, obese  HEAD:  Atraumatic, Normocephalic  EYES: EOMI, PERRLA, conjunctiva and sclera clear  ENMT: No tonsillar erythema, exudates, or enlargement; Moist mucous membranes, Good dentition, No lesions  NECK: Supple, No JVD, Normal thyroid  NERVOUS SYSTEM:  Alert & Oriented X3, Good concentration; Motor Strength 5/5 B/L upper and lower extremities  CHEST/LUNG: Clear to percussion bilaterally; No rales, rhonchi, wheezing, or rubs  HEART: Regular rate and rhythm; No murmurs, rubs, or gallops  ABDOMEN: Soft, Nontender, Nondistended; Bowel sounds present  EXTREMITIES:  2+ Peripheral Pulses, No clubbing, cyanosis, or edema  LYMPH: No lymphadenopathy noted  SKIN: No rashes or lesions      INTERPRETATION OF TELEMETRY: not on monitor    ECG:   Ventricular Rate 115 BPM    Atrial Rate 115 BPM    P-R Interval 194 ms    QRS Duration 90 ms    Q-T Interval 324 ms    QTC Calculation(Bezet) 448 ms    P Axis 52 degrees    R Axis 5 degrees    T Axis 28 degrees    Diagnosis Line *** Poor data quality, interpretation may be adversely affected  Sinus tachycardia  Possible Left atrial enlargement  Confirmed by NINA TRAN, MARU ([id number]) on 7/16/2018 7:38:02 AM          I&O's Detail    16 Jul 2018 07:01  -  17 Jul 2018 07:00  --------------------------------------------------------  IN:    Oral Fluid: 1240 mL  Total IN: 1240 mL    OUT:    Voided: 2925 mL  Total OUT: 2925 mL    Total NET: -1685 mL      17 Jul 2018 07:01  -  18 Jul 2018 05:16  --------------------------------------------------------  IN:    sodium chloride 0.9%.: 225 mL  Total IN: 225 mL    OUT:  Total OUT: 0 mL    Total NET: 225 mL          LABS:                        14.1   15.6  )-----------( 120      ( 16 Jul 2018 06:44 )             43.7     07-16    138  |  93<L>  |  18.0  ----------------------------<  155<H>  4.3   |  28.0  |  0.75    Ca    9.3      16 Jul 2018 06:44      CARDIAC MARKERS ( 17 Jul 2018 07:20 )  x     / <0.01 ng/mL / x     / x     / x              I&O's Summary    16 Jul 2018 07:01  -  17 Jul 2018 07:00  --------------------------------------------------------  IN: 1240 mL / OUT: 2925 mL / NET: -1685 mL    17 Jul 2018 07:01  -  18 Jul 2018 05:16  --------------------------------------------------------  IN: 225 mL / OUT: 0 mL / NET: 225 mL        RADIOLOGY & ADDITIONAL STUDIES:  X-ray:    IMPRESSION: No acute cardiopulmonary disease process.  ANNA SMITH M.D., ATTENDING RADIOLOGIST  This document has been electronically signed. Jul 16 2018  9:10AM                ECHO:  Summary:   1. Technically limited study.   2. Mildly enlarged left atrium.   3. Segmental wall motion abnormalities in RCA territory. Left   ventricular ejection fraction, by visual estimation, is 50-55%. Grade I   diastolic dysfunction.   4. The right atrium is normal in size.   5. Normal right ventricular size and function.   6. No significant valvular abnormality.   7. There is no evidence of pericardial effusion.    [id number] Terell Ye MD, RPVI, Electronically signed on 7/17/2018 at   5:09:25 PM

## 2018-07-18 NOTE — DISCHARGE NOTE ADULT - CARE PLAN
Principal Discharge DX:	Acute respiratory failure with hypoxia  Goal:	resolving  Assessment and plan of treatment:	Continue with meds as directed. Follow up with your primary doctor & pulmonology within 1 week of discharge  Secondary Diagnosis:	COPD exacerbation  Assessment and plan of treatment:	Continue with meds as directed. Follow up with your primary doctor & pulmonology within 1 week of discharge  Secondary Diagnosis:	CAD (coronary artery disease)  Assessment and plan of treatment:	Continue with meds as directed. Follow up with your cardiologist in 1 week for further testing.  Secondary Diagnosis:	Hypertension  Assessment and plan of treatment:	Continue with meds as directed. Follow up with your primary doctor  Secondary Diagnosis:	Lactic acidosis  Goal:	resolved  Assessment and plan of treatment:	Continue with meds as directed. Follow up with your primary doctor  Secondary Diagnosis:	GLORIA (obstructive sleep apnea)  Assessment and plan of treatment:	Continue with meds as directed. Follow up with your primary doctor & pulmonology within 1 week of discharge

## 2018-07-18 NOTE — DISCHARGE NOTE ADULT - HOSPITAL COURSE
58M hx COPD still smoking  GLORIA obesity  HTN [facility] p/w AECOPD with hypoxemic (VQ mismatch) respiratory failure placed on NIV.  Doing well on abx, nebs & IV steroids now off NIV.            Problem/Plan - 1:  ·  Problem: Acute respiratory failure with hypoxia.  Plan: Due to VQ mismatching.  as a consequence of AECOPD    Resolving, now off BiPAP & NC.      Problem/Plan - 2:  ·  Problem: COPD exacerbation.  Plan: Taper IV steroids from q6 to q12, switch to PO in am,  nebs, zithromax po, RVP -ve  Pulm consult appreciated  No hypercapnia on admission, ABG consistent with metabolic acidosis due to lactic acidosis likely from the hypoxia.  Uses spiriva, Symbicort, albuterol & nebs  No O2 or chronic steroids at home  procalcitonin 0.18  repeat CXR pa /lateral unchanged  Pulm consult appreciated.      Problem/Plan - 3:  ·  Problem: Lactic acidosis.  Plan: ABG consistent with metabolic acidosis due to lactic acidosis likely from the hypoxia which has improved  LA trending up to 3.7, added IVF.      Problem/Plan - 4:  ·  Problem: Hypertension.  Plan: resume home meds.      Problem/Plan - 5:  ·  Problem: GLORIA (obstructive sleep apnea).  Plan: CPAP at home (non compliant).      Problem/Plan - 6:  Problem: CAD (coronary artery disease). Plan: DAPT  no chest pain.  Abnormal  ECHO Segmental wall motion abnormalities in RCA territory. Left   ventricular ejection fraction, by visual estimation, is 50-55%. Grade I   diastolic dysfunction.  Cardiology consult was called  Patient [facility] with [facility] for further W/U   He reports being told he had a "blockage" several months ago.   No evidence for acute coronary syndrome this admission, negative troponins.  BNP elevated, likely a reflection of diastolic dysfunction.  Discussed referral for catheterization as an outpatient.  He will follow up with his outpatient cardiologist in the [facility].  To continue aspirin, metoprolol, plavix.  Start atorvastatin 40 mg qhs.  Counseled on tobacco cessation.      PT eval recs home    VSS. Medically stable fro d/c

## 2018-07-18 NOTE — DISCHARGE NOTE ADULT - MEDICATION SUMMARY - MEDICATIONS TO TAKE
I will START or STAY ON the medications listed below when I get home from the hospital:    predniSONE 10 mg oral tablet  -- 6 tab(s) oral - by mouth once a day x 3 days  5 tab(s) oral - by mouth once a day x 3 days  4 tab(s) oral - by mouth once a day x 3 days  3 tab(s) oral - by mouth once a day x 3 days  2 tab(s) oral - by mouth once a day x 3 days  1 tab(s) oral - by mouth once a day x 3 days  -- It is very important that you take or use this exactly as directed.  Do not skip doses or discontinue unless directed by your doctor.  Obtain medical advice before taking any non-prescription drugs as some may affect the action of this medication.  Take with food or milk.    -- Indication: For COPD (chronic obstructive pulmonary disease)    aspirin 81 mg oral delayed release tablet  -- 1 tab(s) by mouth once a day  -- Indication: For ppx    losartan 25 mg oral tablet  -- 1 tab(s) by mouth once a day  -- Indication: For Htn    atorvastatin 40 mg oral tablet  -- 1 tab(s) by mouth once a day (at bedtime)  -- Indication: For CAD (coronary artery disease)    Plavix 75 mg oral tablet  -- 1 tab(s) by mouth once a day  -- Indication: For CAD (coronary artery disease)    ALPRAZolam 0.25 mg oral tablet  -- 1 tab(s) by mouth every 8 hours, As needed, anxiety  -- Indication: For Anxiety    metoprolol succinate 50 mg oral tablet, extended release  -- 1 tab(s) by mouth once a day  -- Indication: For Htn    Spiriva 18 mcg inhalation capsule  -- 1 cap(s) inhaled once a day  -- Indication: For COPD (chronic obstructive pulmonary disease)    albuterol 90 mcg/inh inhalation aerosol  -- 1 puff(s) inhaled every 4 hours, As needed, Shortness of Breath and/or Wheezing  -- Indication: For COPD (chronic obstructive pulmonary disease)    ipratropium-albuterol 0.5 mg-2.5 mg/3 mLinhalation solution  -- 3 milliliter(s) inhaled every 6 hours  -- Indication: For COPD (chronic obstructive pulmonary disease)    Symbicort 160 mcg-4.5 mcg/inh inhalation aerosol  -- 2 puff(s) inhaled 2 times a day  -- Indication: For COPD (chronic obstructive pulmonary disease)    amLODIPine 5 mg oral tablet  -- 1 tab(s) by mouth once a day  -- Indication: For Htn    azithromycin 500 mg oral tablet  -- 1 tab(s) by mouth once a day  -- Indication: For COPD (chronic obstructive pulmonary disease)

## 2018-07-20 LAB
CULTURE RESULTS: SIGNIFICANT CHANGE UP
CULTURE RESULTS: SIGNIFICANT CHANGE UP
SPECIMEN SOURCE: SIGNIFICANT CHANGE UP
SPECIMEN SOURCE: SIGNIFICANT CHANGE UP

## 2018-10-15 ENCOUNTER — EMERGENCY (EMERGENCY)
Facility: HOSPITAL | Age: 58
LOS: 1 days | Discharge: HOME | End: 2018-10-15
Attending: EMERGENCY MEDICINE
Payer: OTHER GOVERNMENT

## 2018-10-15 VITALS
TEMPERATURE: 98 F | HEART RATE: 106 BPM | RESPIRATION RATE: 30 BRPM | SYSTOLIC BLOOD PRESSURE: 172 MMHG | OXYGEN SATURATION: 96 % | WEIGHT: 240.08 LBS | HEIGHT: 67 IN | DIASTOLIC BLOOD PRESSURE: 127 MMHG

## 2018-10-15 VITALS
SYSTOLIC BLOOD PRESSURE: 177 MMHG | DIASTOLIC BLOOD PRESSURE: 86 MMHG | OXYGEN SATURATION: 93 % | RESPIRATION RATE: 20 BRPM | HEART RATE: 78 BPM

## 2018-10-15 DIAGNOSIS — Z95.5 PRESENCE OF CORONARY ANGIOPLASTY IMPLANT AND GRAFT: Chronic | ICD-10-CM

## 2018-10-15 PROBLEM — I25.10 ATHEROSCLEROTIC HEART DISEASE OF NATIVE CORONARY ARTERY WITHOUT ANGINA PECTORIS: Chronic | Status: ACTIVE | Noted: 2018-07-15

## 2018-10-15 PROBLEM — J44.9 CHRONIC OBSTRUCTIVE PULMONARY DISEASE, UNSPECIFIED: Chronic | Status: ACTIVE | Noted: 2018-07-15

## 2018-10-15 PROBLEM — G47.33 OBSTRUCTIVE SLEEP APNEA (ADULT) (PEDIATRIC): Chronic | Status: ACTIVE | Noted: 2018-07-15

## 2018-10-15 PROBLEM — I10 ESSENTIAL (PRIMARY) HYPERTENSION: Chronic | Status: ACTIVE | Noted: 2018-07-15

## 2018-10-15 LAB
ALBUMIN SERPL ELPH-MCNC: 4.2 G/DL — SIGNIFICANT CHANGE UP (ref 3.3–5.2)
ALP SERPL-CCNC: 64 U/L — SIGNIFICANT CHANGE UP (ref 40–120)
ALT FLD-CCNC: 26 U/L — SIGNIFICANT CHANGE UP
ANION GAP SERPL CALC-SCNC: 14 MMOL/L — SIGNIFICANT CHANGE UP (ref 5–17)
AST SERPL-CCNC: 31 U/L — SIGNIFICANT CHANGE UP
BASOPHILS # BLD AUTO: 0 K/UL — SIGNIFICANT CHANGE UP (ref 0–0.2)
BASOPHILS NFR BLD AUTO: 0.1 % — SIGNIFICANT CHANGE UP (ref 0–2)
BILIRUB SERPL-MCNC: 0.5 MG/DL — SIGNIFICANT CHANGE UP (ref 0.4–2)
BUN SERPL-MCNC: 27 MG/DL — HIGH (ref 8–20)
CALCIUM SERPL-MCNC: 9.1 MG/DL — SIGNIFICANT CHANGE UP (ref 8.6–10.2)
CHLORIDE SERPL-SCNC: 102 MMOL/L — SIGNIFICANT CHANGE UP (ref 98–107)
CO2 SERPL-SCNC: 27 MMOL/L — SIGNIFICANT CHANGE UP (ref 22–29)
CREAT SERPL-MCNC: 0.82 MG/DL — SIGNIFICANT CHANGE UP (ref 0.5–1.3)
EOSINOPHIL # BLD AUTO: 0 K/UL — SIGNIFICANT CHANGE UP (ref 0–0.5)
EOSINOPHIL NFR BLD AUTO: 0 % — SIGNIFICANT CHANGE UP (ref 0–5)
GLUCOSE SERPL-MCNC: 116 MG/DL — HIGH (ref 70–115)
HCT VFR BLD CALC: 44.8 % — SIGNIFICANT CHANGE UP (ref 42–52)
HGB BLD-MCNC: 14.4 G/DL — SIGNIFICANT CHANGE UP (ref 14–18)
LYMPHOCYTES # BLD AUTO: 0.8 K/UL — LOW (ref 1–4.8)
LYMPHOCYTES # BLD AUTO: 9.8 % — LOW (ref 20–55)
MCHC RBC-ENTMCNC: 32.1 G/DL — SIGNIFICANT CHANGE UP (ref 32–36)
MCHC RBC-ENTMCNC: 32.7 PG — HIGH (ref 27–31)
MCV RBC AUTO: 101.8 FL — HIGH (ref 80–94)
MONOCYTES # BLD AUTO: 0.2 K/UL — SIGNIFICANT CHANGE UP (ref 0–0.8)
MONOCYTES NFR BLD AUTO: 2.3 % — LOW (ref 3–10)
NEUTROPHILS # BLD AUTO: 7.6 K/UL — SIGNIFICANT CHANGE UP (ref 1.8–8)
NEUTROPHILS NFR BLD AUTO: 87.3 % — HIGH (ref 37–73)
NT-PROBNP SERPL-SCNC: 912 PG/ML — HIGH (ref 0–300)
PLATELET # BLD AUTO: 150 K/UL — SIGNIFICANT CHANGE UP (ref 150–400)
POTASSIUM SERPL-MCNC: 4.9 MMOL/L — SIGNIFICANT CHANGE UP (ref 3.5–5.3)
POTASSIUM SERPL-SCNC: 4.9 MMOL/L — SIGNIFICANT CHANGE UP (ref 3.5–5.3)
PROT SERPL-MCNC: 7 G/DL — SIGNIFICANT CHANGE UP (ref 6.6–8.7)
RBC # BLD: 4.4 M/UL — LOW (ref 4.6–6.2)
RBC # FLD: 15.2 % — SIGNIFICANT CHANGE UP (ref 11–15.6)
SODIUM SERPL-SCNC: 143 MMOL/L — SIGNIFICANT CHANGE UP (ref 135–145)
TROPONIN T SERPL-MCNC: <0.01 NG/ML — SIGNIFICANT CHANGE UP (ref 0–0.06)
WBC # BLD: 8.7 K/UL — SIGNIFICANT CHANGE UP (ref 4.8–10.8)
WBC # FLD AUTO: 8.7 K/UL — SIGNIFICANT CHANGE UP (ref 4.8–10.8)

## 2018-10-15 PROCEDURE — 94640 AIRWAY INHALATION TREATMENT: CPT

## 2018-10-15 PROCEDURE — 36415 COLL VENOUS BLD VENIPUNCTURE: CPT

## 2018-10-15 PROCEDURE — 96374 THER/PROPH/DIAG INJ IV PUSH: CPT

## 2018-10-15 PROCEDURE — 83880 ASSAY OF NATRIURETIC PEPTIDE: CPT

## 2018-10-15 PROCEDURE — 93005 ELECTROCARDIOGRAM TRACING: CPT

## 2018-10-15 PROCEDURE — 99284 EMERGENCY DEPT VISIT MOD MDM: CPT

## 2018-10-15 PROCEDURE — 71045 X-RAY EXAM CHEST 1 VIEW: CPT | Mod: 26

## 2018-10-15 PROCEDURE — 85027 COMPLETE CBC AUTOMATED: CPT

## 2018-10-15 PROCEDURE — 71045 X-RAY EXAM CHEST 1 VIEW: CPT

## 2018-10-15 PROCEDURE — 84484 ASSAY OF TROPONIN QUANT: CPT

## 2018-10-15 PROCEDURE — 80053 COMPREHEN METABOLIC PANEL: CPT

## 2018-10-15 PROCEDURE — 93010 ELECTROCARDIOGRAM REPORT: CPT

## 2018-10-15 PROCEDURE — 99284 EMERGENCY DEPT VISIT MOD MDM: CPT | Mod: 25

## 2018-10-15 RX ORDER — IPRATROPIUM/ALBUTEROL SULFATE 18-103MCG
3 AEROSOL WITH ADAPTER (GRAM) INHALATION ONCE
Qty: 0 | Refills: 0 | Status: COMPLETED | OUTPATIENT
Start: 2018-10-15 | End: 2018-10-15

## 2018-10-15 RX ORDER — IPRATROPIUM BROMIDE 0.2 MG/ML
2.5 SOLUTION, NON-ORAL INHALATION
Qty: 1 | Refills: 0 | OUTPATIENT
Start: 2018-10-15 | End: 2018-10-24

## 2018-10-15 RX ORDER — METOPROLOL TARTRATE 50 MG
100 TABLET ORAL ONCE
Qty: 0 | Refills: 0 | Status: COMPLETED | OUTPATIENT
Start: 2018-10-15 | End: 2018-10-15

## 2018-10-15 RX ADMIN — Medication 50 MILLIGRAM(S): at 13:39

## 2018-10-15 RX ADMIN — Medication 125 MILLIGRAM(S): at 13:51

## 2018-10-15 RX ADMIN — Medication 100 MILLIGRAM(S): at 13:41

## 2018-10-15 RX ADMIN — Medication 3 MILLILITER(S): at 13:39

## 2018-10-15 RX ADMIN — Medication 3 MILLILITER(S): at 15:08

## 2018-10-15 NOTE — ED PROVIDER NOTE - OBJECTIVE STATEMENT
Pt. present to ED c/o SOB x5-6 days. Pt. has hx of COPD and HTN and CAD with One stents. Pt. denies any chest pain. Pt. states that he was in an ED last week wednesday([facility]) for similar symptoms. Pt. was sent home on Medrol dose pack. Pt. took a breathing tx prior to coming to the ED. Pt. states that his drinking is what exacerbate his breathing. Pt. has been drinking for the past few days. Last drink was last night. Pt. also states that he does not want to be admitted to the hospital. He just needs a couple of "breathing tx". Pt. denies any fever. Pt. also states that he did not take any of his blood pressure(Metoprolol/Losartan) medications this morning Pt. present to ED c/o SOB x5-6 days. Pt. has hx of COPD and HTN and CAD with One stents. Pt. denies any chest pain. Pt. states that he was in an ED last week wednesday([facility]) for similar symptoms. Pt. was sent home on Medrol dose pack. Pt. took a breathing tx prior to coming to the ED. Pt. states that his drinking is what exacerbate his breathing. Pt. has been drinking for the past few days. Last drink was last night. Pt. also states that he does not want to be admitted to the hospital. He just needs a couple of "breathing tx". Pt. denies any fever. Pt. also states that he did not take any of his blood pressure(Metoprolol/Losartan) medications this morning.

## 2018-10-15 NOTE — ED PROVIDER NOTE - PHYSICAL EXAMINATION
Pt. awake and alert. No respiratory distress. Mild tachypnea. Pt. speaking full sentences.   Lungs-Diffuse coarse ronchi and expiratory wheeze. O2 SAT 95% no RA  Neuro-Awake and alert. NO tremors noted.

## 2018-10-15 NOTE — SBIRT NOTE. - NSSBIRTSERVICES_GEN_A_ED_FT
Provided SBIRT services: Full screen positive. Referral to Treatment attempted. Screening results were reviewed with the patient and patient was provided information about healthy guidelines and potential negative consequences associated with level of risk. Motivation and readiness to reduce or stop use was discussed and goals and activities to make changes were suggested/offered.  Options discussed for further evaluation and treatment, but referral to treatment was not completed because Patient requires medical care  Audit Score: 26  DAST Score: 0  Duration = 25 Minutes

## 2018-10-15 NOTE — ED ADULT NURSE NOTE - PMH
CAD (coronary artery disease)    COPD (chronic obstructive pulmonary disease)    ETOH abuse    Hypertension    GLORIA (obstructive sleep apnea)

## 2018-10-15 NOTE — SBIRT NOTE. - NSSBIRTSERVICES_GEN_A_ED
Referral to Treatment Provided/Smoking Cessation Information Provided/Brief Intervention Performed/Full Screen ONLY

## 2018-10-15 NOTE — ED ADULT NURSE NOTE - NSIMPLEMENTINTERV_GEN_ALL_ED
Implemented All Universal Safety Interventions:  [place] to call system. Call bell, personal items and telephone within reach. Instruct patient to call for assistance. Room bathroom lighting operational. Non-slip footwear when patient is off stretcher. Physically safe environment: no spills, clutter or unnecessary equipment. Stretcher in lowest position, wheels locked, appropriate side rails in place.

## 2018-10-15 NOTE — ED PROVIDER NOTE - MEDICAL DECISION MAKING DETAILS
Pt. with SOB/Wheezing. No chest pain. Most likely mild COPD exacerbation. Will check labs/CXR/EKG and give breathing tx and re-evaluate.

## 2020-12-07 NOTE — CONSULT NOTE ADULT - CONSULT REASON
Take prescribed Lasix for leg swelling. Practice cell low-sodium diet. Keep legs elevated. Follow up with your primary care provider over the next 2-4 days. Continue at home medications for low back pain. Ice and heat as you feel benefit. Return emergency department for any new or worsening symptoms. Do not take more than 4000 mg of Tylenol in a 24 hour period. May take up to 1000 mg every 6 hours.
Abnormal ECHO
resp failure

## 2021-01-14 NOTE — ED ADULT NURSE NOTE - CAS DISCH TRANSFER METHOD
Size Of Lesion: 2mm Detail Level: Detailed Size Of Lesion: 4mm Size Of Lesion: 3mm Size Of Lesion: 1mm 16 Private car

## 2022-09-22 NOTE — ED ADULT NURSE NOTE - CAS EDN DISCHARGE INTERVENTIONS
Quality 111:Pneumonia Vaccination Status For Older Adults: Pneumococcal vaccine (PPSV23) administered on or after patient’s 60th birthday and before the end of the measurement period
Detail Level: Detailed
Quality 110: Preventive Care And Screening: Influenza Immunization: Influenza Immunization previously received during influenza season
IV discontinued, cath removed intact

## 2024-01-01 NOTE — ED PROVIDER NOTE - PROGRESS NOTE DETAILS
-Bleeding from circumcision site (boy babies only) PT. re-evaluated. Pt. states that he feels better. Breathing has improved. Lungs sound still has +Wheeze less ronchi. Pt. will now receive his BP meds and the steroids. Pt. re-evaluated. Pt. feeling better. Pt. ambulatory in ED and is in no respiratory distress. Pt. will be discharged home.
